# Patient Record
Sex: MALE | Race: WHITE | NOT HISPANIC OR LATINO | Employment: FULL TIME | URBAN - METROPOLITAN AREA
[De-identification: names, ages, dates, MRNs, and addresses within clinical notes are randomized per-mention and may not be internally consistent; named-entity substitution may affect disease eponyms.]

---

## 2019-03-13 ENCOUNTER — OFFICE VISIT (OUTPATIENT)
Dept: FAMILY MEDICINE | Facility: CLINIC | Age: 29
End: 2019-03-13
Payer: COMMERCIAL

## 2019-03-13 VITALS
DIASTOLIC BLOOD PRESSURE: 79 MMHG | SYSTOLIC BLOOD PRESSURE: 126 MMHG | TEMPERATURE: 97.5 F | HEART RATE: 61 BPM | OXYGEN SATURATION: 100 % | RESPIRATION RATE: 16 BRPM

## 2019-03-13 DIAGNOSIS — Z71.84 TRAVEL ADVICE ENCOUNTER: Primary | ICD-10-CM

## 2019-03-13 PROCEDURE — 90471 IMMUNIZATION ADMIN: CPT | Mod: GA | Performed by: NURSE PRACTITIONER

## 2019-03-13 PROCEDURE — 90472 IMMUNIZATION ADMIN EACH ADD: CPT | Mod: GA | Performed by: NURSE PRACTITIONER

## 2019-03-13 PROCEDURE — 90632 HEPA VACCINE ADULT IM: CPT | Mod: GA | Performed by: NURSE PRACTITIONER

## 2019-03-13 PROCEDURE — 99402 PREV MED CNSL INDIV APPRX 30: CPT | Mod: 25 | Performed by: NURSE PRACTITIONER

## 2019-03-13 PROCEDURE — 90715 TDAP VACCINE 7 YRS/> IM: CPT | Mod: GA | Performed by: NURSE PRACTITIONER

## 2019-03-13 PROCEDURE — 90691 TYPHOID VACCINE IM: CPT | Mod: GA | Performed by: NURSE PRACTITIONER

## 2019-03-13 PROCEDURE — 90717 YELLOW FEVER VACCINE SUBQ: CPT | Mod: GA | Performed by: NURSE PRACTITIONER

## 2019-03-13 RX ORDER — ACETAZOLAMIDE 125 MG/1
TABLET ORAL
Qty: 20 TABLET | Refills: 0 | Status: SHIPPED | OUTPATIENT
Start: 2019-03-13 | End: 2021-03-27

## 2019-03-13 RX ORDER — AZITHROMYCIN 500 MG/1
500 TABLET, FILM COATED ORAL DAILY
Qty: 3 TABLET | Refills: 0 | Status: SHIPPED | OUTPATIENT
Start: 2019-03-13 | End: 2019-03-16

## 2019-03-13 NOTE — PROGRESS NOTES
Nurse Note      Itinerary:  [place]  ( [place] x 4 days > [place] x4 days > [place] > 1 day in [facility] )          Departure Date: 04/24/2019      Return Date: 05/10/2019      Length of Trip 17 days       Reason for Travel: Tourism           Urban or rural: both      Accommodations: AirB        IMMUNIZATION HISTORY  Have you received any immunizations within the past 4 weeks?  No  Have you ever fainted from having your blood drawn or from an injection?  No  Have you ever had a fever reaction to vaccination?  No  Have you ever had any bad reaction or side effect from any vaccination?  No  Have you ever had hepatitis A or B vaccine?  Yes  Do you live (or work closely) with anyone who has AIDS, an AIDS-like condition, any other immune disorder or who is on chemotherapy for cancer?  No  Do you have a family history of immunodeficiency?  No  Have you received any injection of immune globulin or any blood products during the past 12 months?  No    Patient roomed by Jacki Sharma MA     Reji Galvez is a 28 year old male seen today with spouse for counsultation for international travel to [place] for Tourism.  Patient itinerary :  See above  Patient's activities will include sightseeing, hiking and high altitude exposure.    Patient's country of birth is [place]    Special medical concerns: none  Pre-travel questionnaire was completed by patient and reviewed by provider.     Vitals: /79   Pulse 61   Temp 97.5  F (36.4  C) (Oral)   Resp 16   SpO2 100%   BMI= There is no height or weight on file to calculate BMI.    EXAM:  General:  Well-nourished, well-developed in no acute distress.  Appears to be stated age, interacts appropriately and expresses understanding of information given to patient.    Current Outpatient Medications   Medication Sig Dispense Refill     acetaZOLAMIDE (DIAMOX) 125 MG tablet Take one tab every 12 hours starting 24 hours prior to [place]  and  continue for 2-3 days 20 tablet 0     azithromycin (ZITHROMAX) 500 MG tablet Take 1 tablet (500 mg) by mouth daily for 3 doses Take 1 tablet a day for up to 3 days for severe diarrhea 3 tablet 0     There is no problem list on file for this patient.    Allergies   Allergen Reactions     Penicillins          Immunizations discussed include:   Hepatitis A:  Ordered/given today, risks, benefits and side effects reviewed  Hepatitis B: Up to date  Influenza: Up to date  Typhoid: Ordered/given today, risks, benefits and side effects reviewed  Rabies: Declined  reviewed managment of a animal bite or scratch (washing wound, seek medical care within 24 hours for post exposure prophylaxis )  Yellow Fever: Stamaril Ordered/given today - consent completed, side effects, precautions, allergies, risks discussed. Patient expressed understanding.  Iraqi Encephalitis: Not indicated  Meningococcus: Not indicated  Tetanus/Diphtheria: Ordered/given today, risks, benefits and side effects reviewed  Measles/Mumps/Rubella: Up to date  Cholera: Not needed  Polio: Up to date  Pneumococcal: Under age of 65  Varicella: Immune by disease history per patient report  Zostavax:  Not indicated  Shingrix: Not indicated  HPV:  Not indicated  TB:  Low risk     Stamaril Informed Consent    The patient was provided with a copy of the IRB-approved consent form and all questions were answered before the patient agreed to participate by signing the informed consent document.   A copy of the form was provided to the patient.    Date: March 13, 2019   Consent Version Date: 5/10/2017   Consent Obtained by:  Caren Martini CNP (Lori)     HIPAA:  Yes  HIPAA Authorization Signed Date: March 13, 2019       Inclusion/Exclusion Criteria:    (Similar to Yellow Fever-VAX)      The patient met all of the following inclusion criteria in order to be eligible for the Stamaril vaccination under this EAP (Expanded Access Investigational New Drug Program)           At  increased risk for YF, including researchers, laboratory workers, vaccine production staff, and those who are traveling within 30 days to a YF-endemic region or to a country requiring proof of YF vaccination under IHRs (International Health Regulations)?       Yes     Patient is greater than or equal to 9 months of age on the day of vaccination?     Yes     Patient is greater than or equal to 18 years of age and signed and dated the Consent Forms?     Yes     Patient is < 18 years of age and parent(s)/guardian(s) signed and dated the Consent Forms?      Patient is 7 years to < 18 years of age and signed and dated the Assent form?        No Assent is required.  Patient is <7 years of age.     No      No      N/A     The patient did not meet any of the following criteria that would have excluded the patient from receiving the Stamaril vaccination under this EAP              Patient is less than 9 months of age.       No     The patient is breast-feeding and cannot stop nursing for at least 14 days after vaccination.    Note: Yellow Fever vaccine virus may be transmissible via breast milk by nursing mothers who are vaccinated during the final 2 weeks of pregnancy or post-partum.   Following transmission, infants may develop encephalitis.  The minimum time of discontinuation of breastfeeding for 14 days after vaccination is based on the expected clearance of live-attenuated vaccine virus.       No     The patient is immunosuppressed, whether congenital or idiopathic, including for example, leukemia, lymphoma, other malignancies, and patients who are receiving immunosuppressant medications (e.g. Systemic corticosteroids [greater than the standard dose of topical or inhaled steroids], alkylating drugs, antimetabolites, of other cytotoxic or immunomodulatory drugs) or radiation therapy or organ transplantation.       No     The patient has known hypersensitivity to the active substance or to any of the excipients of  Stamaril vaccine or to eggs or chicken proteins.     No     The patient is symptomatic for human immunodeficiency virus (HIV) infection     No     The patient is asymptomatic for HIV infection but accompanied by evidence of severe immune suppression    Note:  Evidence of severe immune suppression includes CD4+ T-cell counts < 200 cubic millimeters (or < 15% total lymphocytes in children aged < 6 years), or as determined by the health care provider.       No     The patient has a history of thymus dysfunction (including myasthenia gravis, thymoma, thymectomy)     No     Moderate or severe febrile illness or acute illness    Note: Participation in the EAP can be reassessed when moderate or severe febrile illness or acute illness has resolved.       No         Altitude Exposure on this trip: yes  Past tolerance to Altitude: highest sleeping altitude is about 5,500 ft    ASSESSMENT/PLAN:    ICD-10-CM    1. Travel advice encounter Z71.89 acetaZOLAMIDE (DIAMOX) 125 MG tablet     azithromycin (ZITHROMAX) 500 MG tablet     VACCINE ADMINISTRATION, INITIAL     VACCINE ADMINISTRATION, EACH ADDITIONAL     I have reviewed general recommendations for safe travel   including: food/water precautions, insect precautions, safer sex   practices given high prevalence of Zika, HIV and other STDs,   roadway safety. Educational materials and [employer] report provided.    Malaraia prophylaxis recommended: none  Symptomatic treatment for traveler's diarrhea: azithromycin  Altitude illness prevention and treatment: Diamox prescription given with instructions on use and education provided on Acute altitude illness recognition and prevention.        Evacuation insurance advised and resources were provided to patient.    Total visit time 30 minutes  with over 50% of time spent counseling patient as detailed above.    Caren Martini CNP

## 2019-03-13 NOTE — NURSING NOTE
Chief Complaint   Patient presents with     Travel Clinic     [place]      /79   Pulse 61   Temp 97.5  F (36.4  C) (Oral)   Resp 16   SpO2 100%  There is no height or weight on file to calculate BMI.  bp completed using cuff size: regular       Health Maintenance addressed:  NONE    n/a    Jacki Sharma MA

## 2019-03-13 NOTE — PATIENT INSTRUCTIONS
Today March 13, 2019 you received the    Hepatitis A Vaccine - Please return on 9/9/19 or later for your 2nd and final dose.    Yellow Fever (YF)    Tetanus (Tdap) Vaccine    Typhoid - injectable. This vaccine is valid for two years.   .    These appointments can be made as a NURSE ONLY visit.    **It is very important for the vaccinations to be given on the scheduled day(s), this helps ensure you receive the full effectiveness of the vaccine.**    Please call [facility] with any questions [phone]    Thank you for visiting [facility]

## 2019-03-13 NOTE — NURSING NOTE
Screening Questionnaire for Adult Immunization    Are you sick today?   No   Do you have allergies to medications, food, a vaccine component or latex?   No   Have you ever had a serious reaction after receiving a vaccination?   No   Do you have a long-term health problem with heart disease, lung disease, asthma, kidney disease, metabolic disease (e.g. diabetes), anemia, or other blood disorder?   No   Do you have cancer, leukemia, HIV/AIDS, or any other immune system problem?   No   In the past 3 months, have you taken medications that affect  your immune system, such as prednisone, other steroids, or anticancer drugs; drugs for the treatment of rheumatoid arthritis, Crohn s disease, or psoriasis; or have you had radiation treatments?   No   Have you had a seizure, or a brain or other nervous system problem?   No   During the past year, have you received a transfusion of blood or blood     products, or been given immune (gamma) globulin or antiviral drug?   No   For women: Are you pregnant or is there a chance you could become        pregnant during the next month?   No   Have you received any vaccinations in the past 4 weeks?   No     Immunization questionnaire answers were all negative.        Per orders of RAF Martini, injection of YF, Typhoid, Hep A, and TDAP given by Jacki Sharma. Patient instructed to remain in clinic for 15 minutes afterwards, and to report any adverse reaction to me immediately.       Screening performed by Jacki Sharma on 3/13/2019 at 9:24 AM.

## 2020-03-03 ENCOUNTER — OFFICE VISIT (OUTPATIENT)
Dept: FAMILY MEDICINE | Facility: CLINIC | Age: 30
End: 2020-03-03
Payer: COMMERCIAL

## 2020-03-03 VITALS
RESPIRATION RATE: 16 BRPM | TEMPERATURE: 98.7 F | OXYGEN SATURATION: 99 % | WEIGHT: 225 LBS | HEART RATE: 74 BPM | HEIGHT: 73 IN | DIASTOLIC BLOOD PRESSURE: 82 MMHG | BODY MASS INDEX: 29.82 KG/M2 | SYSTOLIC BLOOD PRESSURE: 126 MMHG

## 2020-03-03 DIAGNOSIS — Z13.6 SCREENING FOR CARDIOVASCULAR CONDITION: ICD-10-CM

## 2020-03-03 DIAGNOSIS — Z00.00 ROUTINE GENERAL MEDICAL EXAMINATION AT A HEALTH CARE FACILITY: Primary | ICD-10-CM

## 2020-03-03 DIAGNOSIS — Z83.719 FAMILY HISTORY OF COLONIC POLYPS: ICD-10-CM

## 2020-03-03 DIAGNOSIS — Z11.4 ENCOUNTER FOR SCREENING FOR HIV: ICD-10-CM

## 2020-03-03 DIAGNOSIS — Z12.5 SCREENING PSA (PROSTATE SPECIFIC ANTIGEN): ICD-10-CM

## 2020-03-03 DIAGNOSIS — Z13.1 SCREENING FOR DIABETES MELLITUS: ICD-10-CM

## 2020-03-03 PROCEDURE — 80061 LIPID PANEL: CPT | Performed by: FAMILY MEDICINE

## 2020-03-03 PROCEDURE — 99395 PREV VISIT EST AGE 18-39: CPT | Mod: 25 | Performed by: FAMILY MEDICINE

## 2020-03-03 PROCEDURE — 90471 IMMUNIZATION ADMIN: CPT | Performed by: FAMILY MEDICINE

## 2020-03-03 PROCEDURE — 82947 ASSAY GLUCOSE BLOOD QUANT: CPT | Performed by: FAMILY MEDICINE

## 2020-03-03 PROCEDURE — 36415 COLL VENOUS BLD VENIPUNCTURE: CPT | Performed by: FAMILY MEDICINE

## 2020-03-03 PROCEDURE — 90632 HEPA VACCINE ADULT IM: CPT | Performed by: FAMILY MEDICINE

## 2020-03-03 PROCEDURE — 87389 HIV-1 AG W/HIV-1&-2 AB AG IA: CPT | Performed by: FAMILY MEDICINE

## 2020-03-03 ASSESSMENT — ENCOUNTER SYMPTOMS
DIARRHEA: 0
HEMATOCHEZIA: 0
DYSURIA: 0
PARESTHESIAS: 0
CONSTIPATION: 0
ABDOMINAL PAIN: 0
NAUSEA: 0
MYALGIAS: 0
HEMATURIA: 0
WEAKNESS: 0
ARTHRALGIAS: 0
HEARTBURN: 0
JOINT SWELLING: 0
COUGH: 0
FREQUENCY: 0
SORE THROAT: 0
NERVOUS/ANXIOUS: 0
DIZZINESS: 0
HEADACHES: 0
FEVER: 0
PALPITATIONS: 0
SHORTNESS OF BREATH: 0
EYE PAIN: 0
CHILLS: 0

## 2020-03-03 ASSESSMENT — MIFFLIN-ST. JEOR: SCORE: 2044.47

## 2020-03-03 NOTE — PROGRESS NOTES
symSUBJECTIVE:   CC: Luis Galvez is an 29 year old male who presents for preventative health visit.     Healthy Habits:     Getting at least 3 servings of Calcium per day:  Yes    Bi-annual eye exam:  Yes    Dental care twice a year:  Yes    Sleep apnea or symptoms of sleep apnea:  None    Diet:  Regular (no restrictions)    Frequency of exercise:  4-5 days/week    Duration of exercise:  45-60 minutes    Taking medications regularly:  Yes    Medication side effects:  Not applicable    PHQ-2 Total Score: 0    Additional concerns today:  Yes (both brothers had colonoscopies in their 30's and had polyps removed. )    Today's PHQ-2 Score:   PHQ-2 ( 1999 Pfizer) 3/3/2020   Q1: Little interest or pleasure in doing things 0   Q2: Feeling down, depressed or hopeless 0   PHQ-2 Score 0   Q1: Little interest or pleasure in doing things Not at all   Q2: Feeling down, depressed or hopeless Not at all   PHQ-2 Score 0       Abuse: Current or Past(Physical, Sexual or Emotional)- No  Do you feel safe in your environment? Yes        Social History     Tobacco Use     Smoking status: Never Smoker     Smokeless tobacco: Never Used   Substance Use Topics     Alcohol use: Yes     Comment: 5 drinks a week          Alcohol Use 3/3/2020   Prescreen: >3 drinks/day or >7 drinks/week? No   Prescreen: >3 drinks/day or >7 drinks/week? -   No flowsheet data found.    Last PSA: No results found for: PSA    Reviewed orders with patient. Reviewed health maintenance and updated orders accordingly - Yes       Reviewed and updated as needed this visit by clinical staff  Tobacco  Allergies  Meds  Med Hx  Surg Hx  Fam Hx  Soc Hx      Reviewed and updated as needed this visit by Provider    Work - .  Exercise - 4 days per week. Lifting weights.   Lives with his wife.     Oldest brother - Jose - abdominal pain - 2 polyps - one was precancerous.   32 yo other brother - 1 polyp - benign.   Paternal father - lung cancer.     Patient  "had cholesterol and diabetes checked and it was fine - it was done in college.     Review of Systems   Constitutional: Negative for chills and fever.   HENT: Negative for congestion, ear pain, hearing loss and sore throat.    Eyes: Negative for pain and visual disturbance.   Respiratory: Negative for cough and shortness of breath.    Cardiovascular: Negative for chest pain, palpitations and peripheral edema.   Gastrointestinal: Negative for abdominal pain, constipation, diarrhea, heartburn, hematochezia and nausea.   Genitourinary: Negative for discharge, dysuria, frequency, genital sores, hematuria, impotence and urgency.   Musculoskeletal: Negative for arthralgias, joint swelling and myalgias.   Skin: Negative for rash.   Neurological: Negative for dizziness, weakness, headaches and paresthesias.   Psychiatric/Behavioral: Negative for mood changes. The patient is not nervous/anxious.      OBJECTIVE:   /82 (BP Location: Left arm, Patient Position: Sitting, Cuff Size: Adult Large)   Pulse 74   Temp 98.7  F (37.1  C) (Oral)   Resp 16   Ht 1.854 m (6' 1\")   Wt 102.1 kg (225 lb)   SpO2 99%   BMI 29.69 kg/m      Physical Exam  GENERAL: healthy, alert and no distress  EYES: Eyes grossly normal to inspection, PERRL and conjunctivae and sclerae normal  HENT: ear canals and TM's normal, nose and mouth without ulcers or lesions  NECK: no adenopathy, no asymmetry, masses, or scars and thyroid normal to palpation  RESP: lungs clear to auscultation - no rales, rhonchi or wheezes  CV: regular rate and rhythm, normal S1 S2, no S3 or S4, no murmur, click or rub, no peripheral edema and peripheral pulses strong  ABDOMEN: soft, nontender, no hepatosplenomegaly, no masses and bowel sounds normal   (male): normal male genitalia without lesions or urethral discharge, no hernia  MS: no gross musculoskeletal defects noted, no edema  SKIN: no suspicious lesions or rashes  NEURO: Normal strength and tone, mentation intact " "and speech normal  PSYCH: mentation appears normal, affect normal/bright         ASSESSMENT/PLAN:   1. Routine general medical examination at a health care facility       2. Family history of colonic polyps  No known family history of colon cancer but colonic polyps present.  As per his request colonoscopy referral is done.   - GASTROENTEROLOGY ADULT REF PROCEDURE ONLY    3. Screening for diabetes mellitus     - Glucose    4. Screening PSA (prostate specific antigen)       5. Screening for cardiovascular condition     - Lipid panel reflex to direct LDL Non-fasting    6. Encounter for screening for HIV     - HIV Screening    COUNSELING:   Reviewed preventive health counseling, as reflected in patient instructions  Special attention given to:        Regular exercise       Healthy diet/nutrition       Vision screening       Hearing screening       Colon cancer screening    Estimated body mass index is 29.69 kg/m  as calculated from the following:    Height as of this encounter: 1.854 m (6' 1\").    Weight as of this encounter: 102.1 kg (225 lb).     Weight management plan: Discussed healthy diet and exercise guidelines     reports that he has never smoked. He has never used smokeless tobacco.    Counseling Resources:  ATP IV Guidelines  Pooled Cohorts Equation Calculator  FRAX Risk Assessment  ICSI Preventive Guidelines  Dietary Guidelines for Americans, 2010  Vinny's MyPlate  ASA Prophylaxis  Lung CA Screening    Nasir Crespo MD  [facility]  "

## 2020-03-03 NOTE — NURSING NOTE
Prior to immunization administration, verified patients identity using patient s name and date of birth. Please see Immunization Activity for additional information.     Screening Questionnaire for Adult Immunization    Are you sick today?   No   Do you have allergies to medications, food, a vaccine component or latex?   No   Have you ever had a serious reaction after receiving a vaccination?   No   Do you have a long-term health problem with heart, lung, kidney, or metabolic disease (e.g., diabetes), asthma, a blood disorder, no spleen, complement component deficiency, a cochlear implant, or a spinal fluid leak?  Are you on long-term aspirin therapy?   No   Do you have cancer, leukemia, HIV/AIDS, or any other immune system problem?   No   Do you have a parent, brother, or sister with an immune system problem?   No   In the past 3 months, have you taken medications that affect  your immune system, such as prednisone, other steroids, or anticancer drugs; drugs for the treatment of rheumatoid arthritis, Crohn s disease, or psoriasis; or have you had radiation treatments?   No   Have you had a seizure, or a brain or other nervous system problem?   No   During the past year, have you received a transfusion of blood or blood    products, or been given immune (gamma) globulin or antiviral drug?   No   For women: Are you pregnant or is there a chance you could become       pregnant during the next month?   No   Have you received any vaccinations in the past 4 weeks?   No     Immunization questionnaire answers were all negative.        Per orders of Dr. Crespo, injection of HEp A given by Mike March. Patient instructed to remain in clinic for 15 minutes afterwards, and to report any adverse reaction to me immediately.       Screening performed by Mike March on 3/3/2020 at 5:24 PM.

## 2020-03-03 NOTE — LETTER
March 5, 2020      Luis FORBES Lenny  [address] [facility] [address]        Dear ,    We are writing to inform you of your test results.    Results within acceptable limits.  -HIV test is normal..    Resulted Orders   HIV Screening   Result Value Ref Range    HIV Antigen Antibody Combo Nonreactive NR^Nonreactive          Comment:      HIV-1 p24 Ag & HIV-1/HIV-2 Ab Not Detected       If you have any questions or concerns, please call the clinic at the number listed above.       Sincerely,        Nasir Crespo MD/nr

## 2020-03-04 LAB
CHOLEST SERPL-MCNC: 225 MG/DL
GLUCOSE SERPL-MCNC: 94 MG/DL (ref 70–99)
HDLC SERPL-MCNC: 36 MG/DL
LDLC SERPL CALC-MCNC: 126 MG/DL
NONHDLC SERPL-MCNC: 189 MG/DL
TRIGL SERPL-MCNC: 317 MG/DL

## 2020-03-05 LAB — HIV 1+2 AB+HIV1 P24 AG SERPL QL IA: NONREACTIVE

## 2020-06-04 ENCOUNTER — VIRTUAL VISIT (OUTPATIENT)
Dept: FAMILY MEDICINE | Facility: OTHER | Age: 30
End: 2020-06-04

## 2020-06-04 NOTE — PROGRESS NOTES
"Date: 2020 08:20:25  Clinician: Marlen Vines  Clinician NPI: [id number]  Patient: Luis Galvez  Patient : 1990  Patient Address: [address] [place] [address]  Patient Phone: [phone]  Visit Protocol: URI  Patient Summary:  Luis is a 29 year old ( : 1990 ) male who initiated a Visit for COVID-19 (Coronavirus) evaluation and screening. When asked the question \"Please sign me up to receive news, health information and promotions. \", Luis responded \"No\".    Luis does not have any symptoms. Luis denies having wheezing, nausea, teeth pain, ageusia, diarrhea, sore throat, enlarged lymph nodes, malaise, myalgias, anosmia, facial pain or pressure, fever, cough, nasal congestion, vomiting, rhinitis, ear pain, headache, and chills. He also denies having recent facial or sinus surgery in the past 60 days and taking antibiotic medication for the symptoms. He is not experiencing dyspnea.    Pertinent COVID-19 (Coronavirus) information  In the past 14 days, Luis has not worked in a congregate living setting.   He does not work or volunteer as healthcare worker or a  and does not work or volunteer in a healthcare facility.   Luis also has not lived in a congregate living setting in the past 14 days. He does not live with a healthcare worker.   Luis has not had a close contact with a laboratory-confirmed COVID-19 patient within 14 days of symptom onset.   Pertinent medical history  Luis does not need a return to work/school note.   Weight: 215 lbs   Luis does not smoke or use smokeless tobacco.   Additional information as reported by the patient (free text): I was a part of [employer] clean up efforts and demonstrations (large crowds). Saw that [facility] recommends people who did that to get tested now.   Weight: 215 lbs    MEDICATIONS: No current medications, ALLERGIES: NKDA  Clinician Response:  Dear Luis,      Unfortunately, you do not meet criteria to be tested " through [facility]. We are only testing those with symptoms at this time.  What are the symptoms of COVID-19?  The most common symptoms are cough, fever and trouble breathing. After 14 days, if there's still no cough or fever, you likely don't have this virus.  If you develop a cough or fever, follow these guidelines.   If you're normally healthy: Please start another [facility] visit to report your symptoms. Go to [email or web address].   If you have a serious health problem (like cancer, heart failure, an organ transplant or kidney disease): Call your specialty clinic. Let them know that you might have COVID-19.    Where can I get more information?  To learn more about COVID-19 and how to care for yourself at home, please visit the CDC website at https://www.cdc.gov/coronavirus/2019-ncov/about/steps-when-sick.html.  For more about your care at [facility], please visit [email or web address]  If you are interested in becoming part of a [facility] clinic trial related to COVID19 please go to [email or web address] for information, if you qualify.   .      Diagnosis: Contact with and (suspected) exposure to other viral communicable diseases  Diagnosis ICD: Z20.828

## 2021-01-03 ENCOUNTER — HEALTH MAINTENANCE LETTER (OUTPATIENT)
Age: 31
End: 2021-01-03

## 2021-03-27 ENCOUNTER — OFFICE VISIT (OUTPATIENT)
Dept: URGENT CARE | Facility: URGENT CARE | Age: 31
End: 2021-03-27
Payer: COMMERCIAL

## 2021-03-27 ENCOUNTER — APPOINTMENT (OUTPATIENT)
Dept: CT IMAGING | Facility: CLINIC | Age: 31
End: 2021-03-27
Attending: EMERGENCY MEDICINE
Payer: COMMERCIAL

## 2021-03-27 ENCOUNTER — HOSPITAL ENCOUNTER (INPATIENT)
Facility: CLINIC | Age: 31
LOS: 2 days | Discharge: HOME | End: 2021-03-29
Attending: EMERGENCY MEDICINE | Admitting: INTERNAL MEDICINE
Payer: COMMERCIAL

## 2021-03-27 VITALS
TEMPERATURE: 98.5 F | DIASTOLIC BLOOD PRESSURE: 75 MMHG | WEIGHT: 225 LBS | RESPIRATION RATE: 16 BRPM | HEART RATE: 97 BPM | OXYGEN SATURATION: 98 % | SYSTOLIC BLOOD PRESSURE: 121 MMHG | BODY MASS INDEX: 29.69 KG/M2

## 2021-03-27 DIAGNOSIS — I30.9 ACUTE PERICARDITIS, UNSPECIFIED TYPE: ICD-10-CM

## 2021-03-27 DIAGNOSIS — R07.89 CHEST DISCOMFORT: Primary | ICD-10-CM

## 2021-03-27 LAB
ALBUMIN SERPL-MCNC: 3.7 G/DL (ref 3.4–5)
ALP SERPL-CCNC: 83 U/L (ref 40–150)
ALT SERPL W P-5'-P-CCNC: 28 U/L (ref 0–70)
ANION GAP SERPL CALCULATED.3IONS-SCNC: 3 MMOL/L (ref 3–14)
AST SERPL W P-5'-P-CCNC: 31 U/L (ref 0–45)
BASOPHILS # BLD AUTO: 0 10E9/L (ref 0–0.2)
BASOPHILS NFR BLD AUTO: 0.3 %
BILIRUB SERPL-MCNC: 0.3 MG/DL (ref 0.2–1.3)
BUN SERPL-MCNC: 12 MG/DL (ref 7–30)
CALCIUM SERPL-MCNC: 9 MG/DL (ref 8.5–10.1)
CHLORIDE SERPL-SCNC: 105 MMOL/L (ref 94–109)
CO2 SERPL-SCNC: 30 MMOL/L (ref 20–32)
CREAT SERPL-MCNC: 1.22 MG/DL (ref 0.66–1.25)
CRP SERPL-MCNC: 34.6 MG/L (ref 0–8)
D DIMER PPP FEU-MCNC: 0.3 UG/ML FEU (ref 0–0.5)
DIFFERENTIAL METHOD BLD: NORMAL
EOSINOPHIL # BLD AUTO: 0.1 10E9/L (ref 0–0.7)
EOSINOPHIL NFR BLD AUTO: 1 %
ERYTHROCYTE [DISTWIDTH] IN BLOOD BY AUTOMATED COUNT: 11.9 % (ref 10–15)
ERYTHROCYTE [SEDIMENTATION RATE] IN BLOOD BY WESTERGREN METHOD: 14 MM/H (ref 0–15)
GFR SERPL CREATININE-BSD FRML MDRD: 79 ML/MIN/{1.73_M2}
GLUCOSE SERPL-MCNC: 99 MG/DL (ref 70–99)
HCT VFR BLD AUTO: 40.9 % (ref 40–53)
HGB BLD-MCNC: 13.7 G/DL (ref 13.3–17.7)
IMM GRANULOCYTES # BLD: 0 10E9/L (ref 0–0.4)
IMM GRANULOCYTES NFR BLD: 0.4 %
LABORATORY COMMENT REPORT: NORMAL
LYMPHOCYTES # BLD AUTO: 1.5 10E9/L (ref 0.8–5.3)
LYMPHOCYTES NFR BLD AUTO: 14.2 %
MCH RBC QN AUTO: 28.8 PG (ref 26.5–33)
MCHC RBC AUTO-ENTMCNC: 33.5 G/DL (ref 31.5–36.5)
MCV RBC AUTO: 86 FL (ref 78–100)
MONOCYTES # BLD AUTO: 1.3 10E9/L (ref 0–1.3)
MONOCYTES NFR BLD AUTO: 12.6 %
NEUTROPHILS # BLD AUTO: 7.3 10E9/L (ref 1.6–8.3)
NEUTROPHILS NFR BLD AUTO: 71.5 %
NRBC # BLD AUTO: 0 10*3/UL
NRBC BLD AUTO-RTO: 0 /100
PLATELET # BLD AUTO: 241 10E9/L (ref 150–450)
POTASSIUM SERPL-SCNC: 3.5 MMOL/L (ref 3.4–5.3)
PROT SERPL-MCNC: 7.3 G/DL (ref 6.8–8.8)
RBC # BLD AUTO: 4.76 10E12/L (ref 4.4–5.9)
SARS-COV-2 RNA RESP QL NAA+PROBE: NEGATIVE
SODIUM SERPL-SCNC: 138 MMOL/L (ref 133–144)
SPECIMEN SOURCE: NORMAL
TROPONIN I SERPL-MCNC: 2.97 UG/L (ref 0–0.04)
TROPONIN I SERPL-MCNC: 4.35 UG/L (ref 0–0.04)
TROPONIN I SERPL-MCNC: 4.7 UG/L (ref 0–0.04)
TROPONIN I SERPL-MCNC: 5.58 UG/L (ref 0–0.04)
WBC # BLD AUTO: 10.2 10E9/L (ref 4–11)

## 2021-03-27 PROCEDURE — U0005 INFEC AGEN DETEC AMPLI PROBE: HCPCS | Performed by: EMERGENCY MEDICINE

## 2021-03-27 PROCEDURE — 99207 PR INPT ADMISSION FROM CLINIC: CPT | Performed by: FAMILY MEDICINE

## 2021-03-27 PROCEDURE — 84484 ASSAY OF TROPONIN QUANT: CPT | Performed by: INTERNAL MEDICINE

## 2021-03-27 PROCEDURE — 93005 ELECTROCARDIOGRAM TRACING: CPT

## 2021-03-27 PROCEDURE — U0003 INFECTIOUS AGENT DETECTION BY NUCLEIC ACID (DNA OR RNA); SEVERE ACUTE RESPIRATORY SYNDROME CORONAVIRUS 2 (SARS-COV-2) (CORONAVIRUS DISEASE [COVID-19]), AMPLIFIED PROBE TECHNIQUE, MAKING USE OF HIGH THROUGHPUT TECHNOLOGIES AS DESCRIBED BY CMS-2020-01-R: HCPCS | Performed by: EMERGENCY MEDICINE

## 2021-03-27 PROCEDURE — 71275 CT ANGIOGRAPHY CHEST: CPT

## 2021-03-27 PROCEDURE — 36415 COLL VENOUS BLD VENIPUNCTURE: CPT | Performed by: INTERNAL MEDICINE

## 2021-03-27 PROCEDURE — C9803 HOPD COVID-19 SPEC COLLECT: HCPCS

## 2021-03-27 PROCEDURE — 80053 COMPREHEN METABOLIC PANEL: CPT | Performed by: EMERGENCY MEDICINE

## 2021-03-27 PROCEDURE — 96374 THER/PROPH/DIAG INJ IV PUSH: CPT

## 2021-03-27 PROCEDURE — 99221 1ST HOSP IP/OBS SF/LOW 40: CPT | Performed by: INTERNAL MEDICINE

## 2021-03-27 PROCEDURE — 93005 ELECTROCARDIOGRAM TRACING: CPT | Mod: 76

## 2021-03-27 PROCEDURE — 85025 COMPLETE CBC W/AUTO DIFF WBC: CPT | Performed by: EMERGENCY MEDICINE

## 2021-03-27 PROCEDURE — 250N000011 HC RX IP 250 OP 636: Performed by: EMERGENCY MEDICINE

## 2021-03-27 PROCEDURE — 250N000013 HC RX MED GY IP 250 OP 250 PS 637: Performed by: INTERNAL MEDICINE

## 2021-03-27 PROCEDURE — 250N000009 HC RX 250: Performed by: EMERGENCY MEDICINE

## 2021-03-27 PROCEDURE — 99223 1ST HOSP IP/OBS HIGH 75: CPT | Mod: AI | Performed by: INTERNAL MEDICINE

## 2021-03-27 PROCEDURE — 86140 C-REACTIVE PROTEIN: CPT | Performed by: EMERGENCY MEDICINE

## 2021-03-27 PROCEDURE — 84484 ASSAY OF TROPONIN QUANT: CPT | Performed by: EMERGENCY MEDICINE

## 2021-03-27 PROCEDURE — 36415 COLL VENOUS BLD VENIPUNCTURE: CPT | Performed by: FAMILY MEDICINE

## 2021-03-27 PROCEDURE — 99291 CRITICAL CARE FIRST HOUR: CPT | Mod: 25

## 2021-03-27 PROCEDURE — 85379 FIBRIN DEGRADATION QUANT: CPT | Performed by: FAMILY MEDICINE

## 2021-03-27 PROCEDURE — 85652 RBC SED RATE AUTOMATED: CPT | Performed by: EMERGENCY MEDICINE

## 2021-03-27 PROCEDURE — 93000 ELECTROCARDIOGRAM COMPLETE: CPT | Performed by: FAMILY MEDICINE

## 2021-03-27 PROCEDURE — 210N000002 HC R&B HEART CARE

## 2021-03-27 PROCEDURE — 250N000013 HC RX MED GY IP 250 OP 250 PS 637: Performed by: EMERGENCY MEDICINE

## 2021-03-27 PROCEDURE — 99285 EMERGENCY DEPT VISIT HI MDM: CPT | Mod: 25

## 2021-03-27 PROCEDURE — 84484 ASSAY OF TROPONIN QUANT: CPT | Performed by: FAMILY MEDICINE

## 2021-03-27 RX ORDER — AMOXICILLIN 250 MG
2 CAPSULE ORAL 2 TIMES DAILY PRN
Status: DISCONTINUED | OUTPATIENT
Start: 2021-03-27 | End: 2021-03-29 | Stop reason: HOSPADM

## 2021-03-27 RX ORDER — IOPAMIDOL 755 MG/ML
78 INJECTION, SOLUTION INTRAVASCULAR ONCE
Status: COMPLETED | OUTPATIENT
Start: 2021-03-27 | End: 2021-03-27

## 2021-03-27 RX ORDER — BISACODYL 10 MG
10 SUPPOSITORY, RECTAL RECTAL DAILY PRN
Status: DISCONTINUED | OUTPATIENT
Start: 2021-03-27 | End: 2021-03-29 | Stop reason: HOSPADM

## 2021-03-27 RX ORDER — NALOXONE HYDROCHLORIDE 0.4 MG/ML
0.4 INJECTION, SOLUTION INTRAMUSCULAR; INTRAVENOUS; SUBCUTANEOUS
Status: DISCONTINUED | OUTPATIENT
Start: 2021-03-27 | End: 2021-03-29 | Stop reason: HOSPADM

## 2021-03-27 RX ORDER — AMOXICILLIN 250 MG
1 CAPSULE ORAL 2 TIMES DAILY PRN
Status: DISCONTINUED | OUTPATIENT
Start: 2021-03-27 | End: 2021-03-29 | Stop reason: HOSPADM

## 2021-03-27 RX ORDER — POLYETHYLENE GLYCOL 3350 17 G/17G
17 POWDER, FOR SOLUTION ORAL DAILY PRN
Status: DISCONTINUED | OUTPATIENT
Start: 2021-03-27 | End: 2021-03-29 | Stop reason: HOSPADM

## 2021-03-27 RX ORDER — NALOXONE HYDROCHLORIDE 0.4 MG/ML
0.2 INJECTION, SOLUTION INTRAMUSCULAR; INTRAVENOUS; SUBCUTANEOUS
Status: DISCONTINUED | OUTPATIENT
Start: 2021-03-27 | End: 2021-03-29 | Stop reason: HOSPADM

## 2021-03-27 RX ORDER — OXYCODONE HYDROCHLORIDE 5 MG/1
5-10 TABLET ORAL
Status: DISCONTINUED | OUTPATIENT
Start: 2021-03-27 | End: 2021-03-29 | Stop reason: HOSPADM

## 2021-03-27 RX ORDER — CETIRIZINE HYDROCHLORIDE 10 MG/1
10 TABLET ORAL DAILY PRN
COMMUNITY
End: 2022-10-13

## 2021-03-27 RX ORDER — ONDANSETRON 2 MG/ML
4 INJECTION INTRAMUSCULAR; INTRAVENOUS EVERY 6 HOURS PRN
Status: DISCONTINUED | OUTPATIENT
Start: 2021-03-27 | End: 2021-03-29 | Stop reason: HOSPADM

## 2021-03-27 RX ORDER — ASPIRIN 81 MG/1
324 TABLET, CHEWABLE ORAL ONCE
Status: COMPLETED | OUTPATIENT
Start: 2021-03-27 | End: 2021-03-27

## 2021-03-27 RX ORDER — ONDANSETRON 4 MG/1
4 TABLET, ORALLY DISINTEGRATING ORAL EVERY 6 HOURS PRN
Status: DISCONTINUED | OUTPATIENT
Start: 2021-03-27 | End: 2021-03-29 | Stop reason: HOSPADM

## 2021-03-27 RX ORDER — DIPHENHYDRAMINE HCL 25 MG
25-50 CAPSULE ORAL EVERY 8 HOURS PRN
Status: DISCONTINUED | OUTPATIENT
Start: 2021-03-27 | End: 2021-03-29 | Stop reason: HOSPADM

## 2021-03-27 RX ORDER — DIPHENHYDRAMINE HCL 25 MG
25-50 TABLET ORAL EVERY 8 HOURS PRN
COMMUNITY
End: 2022-10-13

## 2021-03-27 RX ORDER — LIDOCAINE 40 MG/G
CREAM TOPICAL
Status: DISCONTINUED | OUTPATIENT
Start: 2021-03-27 | End: 2021-03-29 | Stop reason: HOSPADM

## 2021-03-27 RX ORDER — IBUPROFEN 600 MG/1
600 TABLET, FILM COATED ORAL 3 TIMES DAILY
Status: DISCONTINUED | OUTPATIENT
Start: 2021-03-27 | End: 2021-03-29

## 2021-03-27 RX ORDER — CETIRIZINE HYDROCHLORIDE 10 MG/1
10 TABLET ORAL DAILY PRN
Status: DISCONTINUED | OUTPATIENT
Start: 2021-03-27 | End: 2021-03-29 | Stop reason: HOSPADM

## 2021-03-27 RX ORDER — HYDROMORPHONE HYDROCHLORIDE 1 MG/ML
.3-.5 INJECTION, SOLUTION INTRAMUSCULAR; INTRAVENOUS; SUBCUTANEOUS
Status: DISCONTINUED | OUTPATIENT
Start: 2021-03-27 | End: 2021-03-29 | Stop reason: HOSPADM

## 2021-03-27 RX ORDER — IBUPROFEN 200 MG
400-600 TABLET ORAL EVERY 8 HOURS PRN
Status: ON HOLD | COMMUNITY
End: 2021-03-29

## 2021-03-27 RX ORDER — KETOROLAC TROMETHAMINE 15 MG/ML
15 INJECTION, SOLUTION INTRAMUSCULAR; INTRAVENOUS ONCE
Status: COMPLETED | OUTPATIENT
Start: 2021-03-27 | End: 2021-03-27

## 2021-03-27 RX ADMIN — IOPAMIDOL 78 ML: 755 INJECTION, SOLUTION INTRAVENOUS at 14:56

## 2021-03-27 RX ADMIN — KETOROLAC TROMETHAMINE 15 MG: 15 INJECTION, SOLUTION INTRAMUSCULAR; INTRAVENOUS at 16:21

## 2021-03-27 RX ADMIN — SODIUM CHLORIDE 60 ML: 9 INJECTION, SOLUTION INTRAVENOUS at 14:57

## 2021-03-27 RX ADMIN — IBUPROFEN 600 MG: 600 TABLET, FILM COATED ORAL at 22:15

## 2021-03-27 RX ADMIN — ASPIRIN 81 MG CHEWABLE TABLET 324 MG: 81 TABLET CHEWABLE at 14:41

## 2021-03-27 ASSESSMENT — ACTIVITIES OF DAILY LIVING (ADL)
DIFFICULTY_EATING/SWALLOWING: NO
TOILETING_ISSUES: NO
DIFFICULTY_COMMUNICATING: NO
FALL_HISTORY_WITHIN_LAST_SIX_MONTHS: NO
DRESSING/BATHING_DIFFICULTY: NO
ADLS_ACUITY_SCORE: 14

## 2021-03-27 ASSESSMENT — ENCOUNTER SYMPTOMS
CHILLS: 1
MYALGIAS: 1
SHORTNESS OF BREATH: 1

## 2021-03-27 NOTE — CONSULTS
[facility]    Cardiology Consultation     Date of Admission:  3/27/2021    Assessment & Plan   Luis Galvez is a 30 year old male who was admitted on 3/27/2021. I was asked to see the patient for chest pain and diffuse ST elevation    Patient had chest pain and shortness of breath with chills and muscle aches since yesterday.  He originally was seen in urgent care however then he was seen in our emergency department.  Covid testing is negative.  He did receive the Covid vaccine 3 days ago.    EKG is consistent with possible pericarditis with diffuse ST elevation and very minimal CA depression.  Troponin is elevated at 2.97.    The patient is completely chest pain-free if he sets at a certain angle.  This is approximately 30 degrees in the bed.  If he lays flat he develops chest pain.  If he takes a deep breath he developed chest pain.    I was called emergently to the emergency department for evaluation of the patient given the ST elevation chest pain and troponin elevation.  Overall this is consistent with myopericarditis and not a acute ST elevation acute coronary syndrome.    Chest CT scan is negative for pulmonary embolism and D-dimer is normal.    CRP is elevated consistent with myopericarditis    I did a focused bedside ultrasound and there is no significant pericardial effusion.  There is no major abnormalities of his mitral or aortic valve.  There is no regional wall motion abnormalities and LVEF appears normal    The patient specifically denies cocaine or stimulant use.  He seems to be a reliable historian    #1 myopericarditis  The patient is currently not technically admitted to the hospital therefore I cannot put in orders.  I would recommend initiation of colchicine and aspirin once the patient is on the floor.    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      Souleymane Jalloh MD     Code Status    No Order    Reason for Consult   Reason for consult: Chest pain    Primary Care Physician   [facility]    Chief Complaint   Chest pain    History is obtained from the patient    History of Present Illness   Luis Galvez is a 30 year old male who was admitted on 3/27/2021. I was asked to see the patient for chest pain and diffuse ST elevation    Patient had chest pain and shortness of breath with chills and muscle aches since yesterday.  He originally was seen in urgent care however then he was seen in our emergency department.  Covid testing is negative.  He did receive the Covid vaccine 3 days ago.    EKG is consistent with possible pericarditis with diffuse ST elevation and very minimal MD depression.  Troponin is elevated at 2.97.    The patient is completely chest pain-free if he sets at a certain angle.  This is approximately 30 degrees in the bed.  If he lays flat he develops chest pain.  If he takes a deep breath he developed chest pain.    I was called emergently to the emergency department for evaluation of the patient given the ST elevation chest pain and troponin elevation.  Overall this is consistent with myopericarditis and not a acute ST elevation acute coronary syndrome.    Chest CT scan is negative for pulmonary embolism and D-dimer is normal.    CRP is elevated consistent with myopericarditis    I did a focused bedside ultrasound and there is no significant pericardial effusion.  There is no major abnormalities of his mitral or aortic valve.  There is no regional wall motion abnormalities and LVEF appears normal    The patient specifically denies cocaine or stimulant use.  He seems to be a reliable historian    Past Medical History   I have reviewed this patient's medical history and updated it with pertinent information if needed.   No past medical history on file.    Past Surgical  History   I have reviewed this patient's surgical history and updated it with pertinent information if needed.  Past Surgical History:   Procedure Laterality Date     CRYO-FOCAL RETINAL LASER OS (LEFT EYE) Left 2006     right knee arthroscopic surgery Right 2012       Prior to Admission Medications   Prior to Admission Medications   Prescriptions Last Dose Informant Patient Reported? Taking?   cetirizine (ZYRTEC) 10 MG tablet 3/27/2021 at Unknown time Spouse/Significant Other Yes Yes   Sig: Take 10 mg by mouth daily as needed for allergies   diphenhydrAMINE (BENADRYL) 25 MG tablet 3/27/2021 at Unknown time Spouse/Significant Other Yes Yes   Sig: Take 25-50 mg by mouth every 8 hours as needed for itching or allergies   ibuprofen (ADVIL/MOTRIN) 200 MG tablet 3/27/2021 at 1350 Spouse/Significant Other Yes Yes   Sig: Take 400-600 mg by mouth every 8 hours as needed for mild pain      Facility-Administered Medications: None     Allergies   Allergies   Allergen Reactions     Penicillins        Social History   I have reviewed this patient's social history and updated it with pertinent information if needed. Luis Galvez  reports that he has never smoked. He has never used smokeless tobacco. He reports current alcohol use. He reports that he does not use drugs.    Family History   I have reviewed this patient's family history and updated it with pertinent information if needed.   Family History   Problem Relation Age of Onset     Hypertension Father        Review of Systems   The 12 point Review of Systems is negative other than noted in the HPI or here.     Physical Exam       BP: 116/81 Pulse: 101   Resp: 13 SpO2: 96 % O2 Device: None (Room air)    Vital Signs with Ranges  Temp:  [98.5  F (36.9  C)] 98.5  F (36.9  C)  Pulse:  [] 101  Resp:  [6-17] 13  BP: (116-147)/(75-90) 116/81  SpO2:  [96 %-98 %] 96 %  0 lbs 0 oz    GENERAL APPEARANCE: Alert and oriented x3. No acute distress.  SKIN: Inspection of the skin  reveals no rashes, ulcerations, warm, dry  NECK: Supple and symmetric.   Normal JVP  LUNGS: Clear breath sounds throughout bilaterally, no wheezes, no rhonchi  CARDIOVASCULAR: S1, S2, regular rate and rhythm without any murmurs, gallops, rubs. The carotid pulses were normal and 2+ bilaterally without bruits. Peripheral pulses were 2+ and symmetric.  ABDOMEN: Soft, non-tender, non-distended with normal bowel sounds.  No ascites noted.  EXTREMITIES: No edema.  NEUROLOGIC:  Normal mood and affect.  Sensation to touch was normal.      Data   Results for orders placed or performed during the hospital encounter of 03/27/21 (from the past 24 hour(s))   CBC with platelets differential   Result Value Ref Range    WBC 10.2 4.0 - 11.0 10e9/L    RBC Count 4.76 4.4 - 5.9 10e12/L    Hemoglobin 13.7 13.3 - 17.7 g/dL    Hematocrit 40.9 40.0 - 53.0 %    MCV 86 78 - 100 fl    MCH 28.8 26.5 - 33.0 pg    MCHC 33.5 31.5 - 36.5 g/dL    RDW 11.9 10.0 - 15.0 %    Platelet Count 241 150 - 450 10e9/L    Diff Method Automated Method     % Neutrophils 71.5 %    % Lymphocytes 14.2 %    % Monocytes 12.6 %    % Eosinophils 1.0 %    % Basophils 0.3 %    % Immature Granulocytes 0.4 %    Nucleated RBCs 0 0 /100    Absolute Neutrophil 7.3 1.6 - 8.3 10e9/L    Absolute Lymphocytes 1.5 0.8 - 5.3 10e9/L    Absolute Monocytes 1.3 0.0 - 1.3 10e9/L    Absolute Eosinophils 0.1 0.0 - 0.7 10e9/L    Absolute Basophils 0.0 0.0 - 0.2 10e9/L    Abs Immature Granulocytes 0.0 0 - 0.4 10e9/L    Absolute Nucleated RBC 0.0    Comprehensive metabolic panel   Result Value Ref Range    Sodium 138 133 - 144 mmol/L    Potassium 3.5 3.4 - 5.3 mmol/L    Chloride 105 94 - 109 mmol/L    Carbon Dioxide 30 20 - 32 mmol/L    Anion Gap 3 3 - 14 mmol/L    Glucose 99 70 - 99 mg/dL    Urea Nitrogen 12 7 - 30 mg/dL    Creatinine 1.22 0.66 - 1.25 mg/dL    GFR Estimate 79 >60 mL/min/[1.73_m2]    GFR Estimate If Black >90 >60 mL/min/[1.73_m2]    Calcium 9.0 8.5 - 10.1 mg/dL    Bilirubin  Total 0.3 0.2 - 1.3 mg/dL    Albumin 3.7 3.4 - 5.0 g/dL    Protein Total 7.3 6.8 - 8.8 g/dL    Alkaline Phosphatase 83 40 - 150 U/L    ALT 28 0 - 70 U/L    AST 31 0 - 45 U/L   Troponin I   Result Value Ref Range    Troponin I ES 4.700 (HH) 0.000 - 0.045 ug/L   CRP inflammation   Result Value Ref Range    CRP Inflammation 34.6 (H) 0.0 - 8.0 mg/L   Erythrocyte sedimentation rate auto   Result Value Ref Range    Sed Rate 14 0 - 15 mm/h   CT Chest Pulmonary Embolism w Contrast    Narrative    CT CHEST PULMONARY EMBOLISM WITH CONTRAST March 27, 2021 3:14 PM    CLINICAL HISTORY: Chest pain. Elevated troponin.    TECHNIQUE: CT angiogram chest during arterial phase injection IV  contrast. 2D and 3D MIP reconstructions were performed by the CT  technologist. Dose reduction techniques were used.   CONTRAST: 78 mL Isovue-370        COMPARISON: None.    FINDINGS:  ANGIOGRAM CHEST: Pulmonary arteries are normal caliber and negative  for pulmonary emboli. Thoracic aorta is negative for dissection.     LUNGS AND PLEURA: No pleural effusions. The lungs are clear.    MEDIASTINUM/AXILLAE: No enlarged lymph nodes are identified in the  chest. No pericardial effusion.    UPPER ABDOMEN: Limited views of the upper abdomen are unremarkable.    MUSCULOSKELETAL: Unremarkable.      Impression    IMPRESSION: No acute abnormality in the chest. No evidence for  pulmonary embolism.

## 2021-03-27 NOTE — PHARMACY-ADMISSION MEDICATION HISTORY
Pharmacy Medication History  Admission medication history interview status for the 3/27/2021  admission is complete. See EPIC admission navigator for prior to admission medications     Location of Interview: Patient room  Medication history sources: Patient and Patient's family/friend (wife)    Significant changes made to the medication list:  Added all meds to list    In the past week, patient estimated taking medication this percent of the time: n/a. All prns    Additional medication history information:   none    Medication reconciliation completed by provider prior to medication history? No    Time spent in this activity: 10 minutes    Prior to Admission medications    Medication Sig Last Dose Taking? Auth Provider   cetirizine (ZYRTEC) 10 MG tablet Take 10 mg by mouth daily as needed for allergies 3/27/2021 at Unknown time Yes Unknown, Entered By History   diphenhydrAMINE (BENADRYL) 25 MG tablet Take 25-50 mg by mouth every 8 hours as needed for itching or allergies 3/27/2021 at Unknown time Yes Unknown, Entered By History   ibuprofen (ADVIL/MOTRIN) 200 MG tablet Take 400-600 mg by mouth every 8 hours as needed for mild pain 3/27/2021 at 1350 Yes Unknown, Entered By History       The information provided in this note is only as accurate as the sources available at the time of update(s)

## 2021-03-27 NOTE — ED NOTES
[facility]  ED Nurse Handoff Report    ED Chief complaint: Chest Pain      ED Diagnosis:   Final diagnoses:   Acute pericarditis, unspecified type       Code Status: Full Code    Allergies:   Allergies   Allergen Reactions     Penicillins        Patient Story: Pt went to urgent care with chest pain. Trop levels were elevated and pt was sent here  Focused Assessment:  Pt has chest pain that does radiate to back or arms. Pt had blood levels drawn and troponin is increasing. CT revealed no signs of PE. Echo of heart performed and ruled myopericarditis. Pt stable otherwise.     Treatments and/or interventions provided: EKG, CT, Echocardiogram   Patient's response to treatments and/or interventions: tolerated well    To be done/followed up on inpatient unit:  continue to monitor    Does this patient have any cognitive concerns?: none    Activity level - Baseline/Home:  Independent  Activity Level - Current:   Independent    Patient's Preferred language: English   Needed?: No    Isolation: None  Infection: Not Applicable  Patient tested for COVID 19 prior to admission: YES  Bariatric?: No    Vital Signs:   Vitals:    03/27/21 1419   BP: (!) 147/90   Pulse: 121   Resp: 17   SpO2: 97%       Cardiac Rhythm:     Was the PSS-3 completed:   Yes  What interventions are required if any?               Family Comments:   OBS brochure/video discussed/provided to patient/family: No              Name of person given brochure if not patient:               Relationship to patient:     For the majority of the shift this patient's behavior was Green.   Behavioral interventions performed were .    ED NURSE PHONE NUMBER: [id number]

## 2021-03-27 NOTE — PLAN OF CARE
A+Ox4, up indep. VSS on RA. Mid sternal CP 2/10 improving. No radiation, no SOB. NSR. Cardiology consult and echo pending.

## 2021-03-27 NOTE — PROGRESS NOTES
RECEIVING UNIT ED HANDOFF REVIEW    ED Nurse Handoff Report was reviewed by: Norma Edge RN on March 27, 2021 at 4:12 PM

## 2021-03-27 NOTE — PROGRESS NOTES
SUBJECTIVE:   Luis Galvez is a 30 year old male presenting with a chief complaint of shortness of breath ( a dull ache at the lower breast bone area lasting seconds and more noticeable with deep inhalation) chills, muscle aches at the posterior bilateral neck and posterior shoulders.  .The chest discomfort is improved when standing or sitting up.    Onset of symptoms was yesterday.  Treatment measures tried include Tylenol, Benadryl without relief.  .  Predisposing factors include none. .  Patient does bench presses; however, he had a problem with bench presses two weeks ago but not more recently.      Three days ago, patient received his second dose of the COVID-19 vaccine and developed chills, muscle aches.     Patient's Rapid nasopharyngeal COVID-19 test was performed today at  [facility].  The test result was negative for COVID-19.      No sick contacts with COVID-19.  No sick contacts with other illness.    No loss of smell/taste  No headaches  There has been some neck stiffness  No rash  No cough  No nose symptoms  No sore throat  No bluish lips/toes/fingers.      Past Medical History:    No major medical problems.     Current Outpatient Medications   Medication Sig Dispense Refill     acetaZOLAMIDE (DIAMOX) 125 MG tablet Take one tab every 12 hours starting 24 hours prior to [place]  and continue for 2-3 days (Patient not taking: Reported on 3/3/2020) 20 tablet 0     Social History     Tobacco Use     Smoking status: Never Smoker     Smokeless tobacco: Never Used   Substance Use Topics     Alcohol use: Yes     Comment: 5 drinks a week        ROS:  CONSTITUTIONAL:NEGATIVE  for fevers.   ENT/MOUTH: No sore throat/nasal symptoms.    RESP: positive for shortness of breath.    CV: positive for midline inferior chest discomfort more noticeable with deep inhalation and improved when sitting up/standing.  .    MUSCULOSKELETAL: positive for muscle aches only at the posterior neck and posterior shoulders/upper back.     NEURO: negative for headaches.      OBJECTIVE:  /75   Pulse 97   Temp 98.5  F (36.9  C)   Resp 16   Wt 102.1 kg (225 lb)   SpO2 98%   BMI 29.69 kg/m    GENERAL APPEARANCE: healthy, alert and no distress.  No acute respiratory distress.    RESP: lungs clear to auscultation - no rales, rhonchi or wheezes  CV: regular rates and rhythm, normal S1 S2, no murmur noted.  No friction rubs.    CHEST WALL:  I was unable to reproduce the chest discomfort with palpation over the anterior chest wall.  SKIN:  No diaphoresis.        EKG:  Normal sinus rate and rhythm (rate:  88 beats per minute) Normal axis.  There is an elevated ST segment at lead III, V4, V5.  Possible repolarization variant.      chest x-ray:  There was no X-ray tech at the clinic, and another X-ray tech will not be able to work at the urgent care clinic until after 3 pm today.      ASSESSMENT:  Chest Discomfort    PLAN:  Pending labs:  Troponin, D-Dimer    Addendum:  The Troponin I is elevated at 2.972 (normal values are 0 to 0.045).  The D-dimer is normal at 0.3.  I phoned the patient with the result of the elevated Troponin I and the normal D-Dimer    I cancelled the chest x-ray order, since there would be no X-ray tech until at the urgent care clinic after 3 pm and since the patient will be going to the [facility] emergency room for further evaluation and treatment.  Patient's vital signs were within normal limits. Patient will go to the emergency room via private vehicle (His wife will drive him there.)    Jonh Plascencia MD

## 2021-03-27 NOTE — H&P
"[facility]    History and Physical - Hospitalist Service       Date of Admission:  3/27/2021    Assessment & Plan   Lius Galvez is a 30 year old male admitted on 3/27/2021. He has no significant past medical history.  He presented to urgent care earlier today complaining of chest pain.  EKG was abnormal and initial troponin was elevated.  He was sent to the emergency department for evaluation and urgently evaluated by cardiology where history, exam and labs are felt to be consistent with pericarditis.  He is admitted for further evaluation and treatment.    Principal Problem:    Acute pericarditis, unspecified type    Admit as inpatient    Follow serial troponins and EKGs    Cardiology consult requested, I appreciate Dr. Jalloh's evaluation and recommendations    Check echo.  Will need repeat echo in 2 to 4 weeks    Treat with NSAIDs, ibuprofen 600 mg 3 times daily         Diet:   Regular  DVT Prophylaxis: Pneumatic Compression Devices  [place] Catheter: not present  Code Status:   Full         Disposition Plan   Expected discharge: Tomorrow, recommended to prior living arrangement once chest pain has been treated.  Entered: Jaymie Mcdonough MD 03/27/2021, 3:54 PM     The patient's care was discussed with the Patient and ED MD, Dr. Josue.    Jaymie Mcdonough MD  [facility]  Contact information available via [facility] Paging/Directory      ______________________________________________________________________    Chief Complaint   \"It is (chest pain) definitely better when I am sitting up.\"        History of Present Illness   Luis Galvez is a 30 year old male with history of seasonal allergies who presents emergency department complaining of chest pain.    Gama began having chest pain last evening.  He says pain was initially mild and he did not think much of it.  Pain worsened throughout the night hours and he was ultimately unable to sleep due to pain, got up out " of bed and went out to the couch.    Pain is a sharp quality does not radiate to the neck back arm or jaw.  He did not perspire.  He took some ibuprofen and this did not help the pain very much.  He went to urgent care for evaluation where EKG and some diffuse ST segment changes and initial troponin was elevated.  He was sent to the emergency department for evaluation.    In the emergency department, he was evaluated by Dr. Jalloh of cardiology.  Dr. Jalloh performed bedside echocardiogram, this does not show any wall motion abnormalities.  There is no pericardial effusion, and left ventricular ejection fraction appeared normal.  The history, clinical exam and EKG findings are felt to be consistent with myopericarditis.  Patient is admitted for further evaluation and treatment.    Review of Systems    The 10 point Review of Systems is negative other than noted in the HPI or here.     Past Medical History    I have reviewed this patient's medical history and updated it with pertinent information if needed.   No significant past medical history.    Past Surgical History   I have reviewed this patient's surgical history and updated it with pertinent information if needed.  Past Surgical History:   Procedure Laterality Date     CRYO-FOCAL RETINAL LASER OS (LEFT EYE) Left 2006     right knee arthroscopic surgery Right 2012       Social History   I have reviewed this patient's social history and updated it with pertinent information if needed.  Social History     Tobacco Use     Smoking status: Never Smoker     Smokeless tobacco: Never Used   Substance Use Topics     Alcohol use: Yes     Comment: 5 drinks a week      Drug use: Never       Family History   I have reviewed this patient's family history and updated it with pertinent information if needed.  Family History   Problem Relation Age of Onset     Hypertension Father        Prior to Admission Medications   Prior to Admission Medications   Prescriptions Last Dose  Informant Patient Reported? Taking?   cetirizine (ZYRTEC) 10 MG tablet 3/27/2021 at Unknown time Spouse/Significant Other Yes Yes   Sig: Take 10 mg by mouth daily as needed for allergies   diphenhydrAMINE (BENADRYL) 25 MG tablet 3/27/2021 at Unknown time Spouse/Significant Other Yes Yes   Sig: Take 25-50 mg by mouth every 8 hours as needed for itching or allergies   ibuprofen (ADVIL/MOTRIN) 200 MG tablet 3/27/2021 at 1350 Spouse/Significant Other Yes Yes   Sig: Take 400-600 mg by mouth every 8 hours as needed for mild pain      Facility-Administered Medications: None     Allergies   Allergies   Allergen Reactions     Penicillins        Physical Exam   Vital Signs:     BP: 116/81 Pulse: 101   Resp: 13 SpO2: 96 % O2 Device: None (Room air)    Weight: 0 lbs 0 oz    Constitutional: Awake, alert, cooperative, no apparent distress.  Eyes: Conjunctiva and pupils examined and normal.  HEENT: Moist mucous membranes, normal dentition.  Respiratory: Clear to auscultation bilaterally, no crackles or wheezing.  Cardiovascular: Regular rate and rhythm, normal S1 and S2, and no murmur noted. No friction rub.  GI: Soft, non-distended, non-tender, normal bowel sounds.  Lymph/Hematologic: No anterior cervical or supraclavicular adenopathy.  Skin: No rash on exposed skin.  Musculoskeletal: No joint swelling, erythema or tenderness.  Neurologic:Face is symmetric.  Moves both arms and both legs.  The neurologic exam is nonfocal.  Psychiatric: Alert, oriented to person, place and time, no obvious anxiety or depression.  Mood is very pleasant.        Data   Data reviewed today: I reviewed all medications, new labs and imaging results over the last 24 hours. I personally reviewed the chest CT image(s) showing no pulmonary embolus, no infiltrate. and EKG showing ST segment elevation in leads I, II, aVF and throughout the precordial leads.    Recent Labs   Lab 03/27/21  1433 03/27/21  1150   WBC 10.2  --    HGB 13.7  --    MCV 86  --    PLT  241  --      --    POTASSIUM 3.5  --    CHLORIDE 105  --    CO2 30  --    BUN 12  --    CR 1.22  --    ANIONGAP 3  --    FARTUN 9.0  --    GLC 99  --    ALBUMIN 3.7  --    PROTTOTAL 7.3  --    BILITOTAL 0.3  --    ALKPHOS 83  --    ALT 28  --    AST 31  --    TROPI 4.700* 2.972*     Recent Results (from the past 24 hour(s))   CT Chest Pulmonary Embolism w Contrast    Narrative    CT CHEST PULMONARY EMBOLISM WITH CONTRAST March 27, 2021 3:14 PM    CLINICAL HISTORY: Chest pain. Elevated troponin.    TECHNIQUE: CT angiogram chest during arterial phase injection IV  contrast. 2D and 3D MIP reconstructions were performed by the CT  technologist. Dose reduction techniques were used.   CONTRAST: 78 mL Isovue-370        COMPARISON: None.    FINDINGS:  ANGIOGRAM CHEST: Pulmonary arteries are normal caliber and negative  for pulmonary emboli. Thoracic aorta is negative for dissection.     LUNGS AND PLEURA: No pleural effusions. The lungs are clear.    MEDIASTINUM/AXILLAE: No enlarged lymph nodes are identified in the  chest. No pericardial effusion.    UPPER ABDOMEN: Limited views of the upper abdomen are unremarkable.    MUSCULOSKELETAL: Unremarkable.      Impression    IMPRESSION: No acute abnormality in the chest. No evidence for  pulmonary embolism.

## 2021-03-27 NOTE — ED PROVIDER NOTES
History     Chief Complaint:  Chest Pain    HPI  Luis Galvez is a 30 year old male who presents for evaluation of chest pain. He initially presented to urgent care today with this pain, shortness of breath, chills, muscle aches, and pain in both shoulders which all began yesterday. Of note, he received his second dose of the Moderna vaccine 3 days ago. Rapid COVID test done there was negative. His troponin there returned elevated at 2.97 and EKG showed ST elevation. On evaluation here he has minimal pain while resting but getting up to walk into the room worsened his pain and shortness of breath. He denies a history of blood clots, travel, or family history of clotting disorder.     Review of Systems   Constitutional: Positive for chills.   Respiratory: Positive for shortness of breath.    Cardiovascular: Positive for chest pain.   Musculoskeletal: Positive for myalgias.      10 systems reviewed and negative except as above and in HPI.      Allergies:  Penicillins    Medications:    Diamox     Past Medical History:    No history of cardiac issues or clots    Past Surgical History:    Cryo-Focal Retinal Laser OS (left eye)  Right knee arthroscopic surgery    Family History:    Hypertension in his father    Social History:  The patient arrives with a family member  Is a weightlifter       Physical Exam     Patient Vitals for the past 24 hrs:   BP Pulse Resp SpO2   03/27/21 1630 -- 90 24 98 %   03/27/21 1615 120/80 103 (!) 7 97 %   03/27/21 1600 124/80 100 10 97 %   03/27/21 1545 116/81 101 -- 96 %   03/27/21 1530 131/89 110 13 98 %   03/27/21 1515 131/85 105 (!) 6 97 %   03/27/21 1419 (!) 147/90 121 17 97 %     Physical Exam  General: Resting on the gurney, appears comfortable  Head:  The scalp, face, and head appear normal  Mouth/Throat: Mucus membranes are moist  CV:  Rapid but regular rate    Normal S1 and S2  No pathological murmur   Resp:  Breath sounds clear and equal bilaterally    Non-labored, no retractions  or accessory muscle use    No coarseness    No wheezing   GI:  Abdomen is soft, no rigidity    No tenderness to palpation  MS:  Normal motor assessment of all extremities.    Good capillary refill noted.    No lower extremity swelling, redness, or excess warmth  Skin:   No rash or lesions noted.  Neuro:  Speech is normal and fluent. No apparent deficit.  Psych:  Awake. Alert.  Normal affect.      Appropriate interactions.    Emergency Department Course     ECG  ECG taken at 1420, ECG read at 1423  Sinus tachycardia  Right chavez axis  Inferior infarct age undetermined   Abnormal EKG   Rate 114 bpm. UT interval 144 ms. QRS duration 98 ms. QT/QTc 338/465 ms. P-R-T axes 55 91 39.     ECG  ECG taken at 1443, ECG read at 1445  Sinus tachycardia  Acute pericarditis  Abnormal EKG   Rate 113 bpm. UT interval 148 ms. QRS duration 94 ms. QT/QTc 336/460 ms. P-R-T axes 55 89 42.     Imaging:  CT Chest Pulmonary Embolism w contrast   IMPRESSION: No acute abnormality in the chest. No evidence for   pulmonary embolism  Reading per radiology    Laboratory:  CBC: WBC 10.2, HGB 13.7,   CMP: WNL (Creatinine: 1.22)    Troponin (Collected 1433): 4.70 (H)  CRP inflammation: 34.6 (H)  Erythrocyte sedimentation rate auto: 14  Asymptomatic COVID-19 PCR: Pending     Emergency Department Course:    Reviewed:  I reviewed nursing notes, vitals, past medical history and care everywhere    Assessments:  1425 I obtained history and examined the patient as noted above.     1445 Cardiology performed a bedside cardiac ultrasound and spoke with patient    1540 I rechecked the patient and explained findings.     Consults:   1432: I spoke with cardiology regarding this patient  1450: I spoke with cardiology regarding the patient while they were at bedside  1537: I spoke with Dr. Mcdonough of hospitalist service regarding this patient.    Interventions:  1441 Aspirin 324 mg PO  1621 Toradol, 15 mg, IV    Disposition:  The patient was admitted to the  [facility] under the care of Dr. Mcdonough.     Impression & Plan      Critical Care time was 35 minutes for this patient excluding procedures.     Medical Decision Making:  Luis Galvez is a 30 year old male who presents to the emergency department today for evaluation of chest pain, abnormal EKG, and elevated troponin.  Initial concern was for ST elevation MI versus massive pulmonary embolism versus pericarditis.  EKG was repeated which is most consistent with pericarditis, however, given his pleuritic pain and considerably elevated troponin a CT PE study was obtained which was unremarkable.  I discussed the case with cardiology who came to the ED to see the patient and performed a bedside echo which showed no focal wall motion abnormalities.  They requested the patient be admitted to the hospitalist service, a formal echo be obtained.  The patient is comfortable with this plan and is admitted to the hospitalist in stable condition.      Covid-19  Luis Galvez was evaluated during a global COVID-19 pandemic, which necessitated consideration that the patient might be at risk for infection with the SARS-CoV-2 virus that causes COVID-19.   Applicable protocols for evaluation were followed during the patient's care.   COVID-19 was considered as part of the patient's evaluation. The plan for testing is:  a test was obtained during this visit.      Diagnosis:    ICD-10-CM    1. Acute pericarditis, unspecified type  I30.9 Asymptomatic SARS-CoV-2 COVID-19 Virus (Coronavirus) by PCR       Scribe Disclosure:  Lila AMARO, am serving as a scribe at 2:25 PM on 3/27/2021 to document services personally performed by Conchita Josue MD based on my observations and the provider's statements to me.    [facility] EMERGENCY DEPT     Conchita Josue MD  03/27/21 8921

## 2021-03-28 ENCOUNTER — APPOINTMENT (OUTPATIENT)
Dept: CARDIOLOGY | Facility: CLINIC | Age: 31
End: 2021-03-28
Attending: INTERNAL MEDICINE
Payer: COMMERCIAL

## 2021-03-28 LAB
ANION GAP SERPL CALCULATED.3IONS-SCNC: 1 MMOL/L (ref 3–14)
BUN SERPL-MCNC: 13 MG/DL (ref 7–30)
CALCIUM SERPL-MCNC: 8.6 MG/DL (ref 8.5–10.1)
CHLORIDE SERPL-SCNC: 107 MMOL/L (ref 94–109)
CO2 SERPL-SCNC: 29 MMOL/L (ref 20–32)
CREAT SERPL-MCNC: 1.08 MG/DL (ref 0.66–1.25)
GFR SERPL CREATININE-BSD FRML MDRD: >90 ML/MIN/{1.73_M2}
GLUCOSE SERPL-MCNC: 99 MG/DL (ref 70–99)
POTASSIUM SERPL-SCNC: 4.1 MMOL/L (ref 3.4–5.3)
SODIUM SERPL-SCNC: 137 MMOL/L (ref 133–144)
TROPONIN I SERPL-MCNC: 2.96 UG/L (ref 0–0.04)

## 2021-03-28 PROCEDURE — 210N000002 HC R&B HEART CARE

## 2021-03-28 PROCEDURE — 80048 BASIC METABOLIC PNL TOTAL CA: CPT | Performed by: INTERNAL MEDICINE

## 2021-03-28 PROCEDURE — 255N000002 HC RX 255 OP 636: Performed by: INTERNAL MEDICINE

## 2021-03-28 PROCEDURE — 99223 1ST HOSP IP/OBS HIGH 75: CPT | Mod: 25 | Performed by: INTERNAL MEDICINE

## 2021-03-28 PROCEDURE — 36415 COLL VENOUS BLD VENIPUNCTURE: CPT | Performed by: INTERNAL MEDICINE

## 2021-03-28 PROCEDURE — 93005 ELECTROCARDIOGRAM TRACING: CPT

## 2021-03-28 PROCEDURE — 250N000013 HC RX MED GY IP 250 OP 250 PS 637: Performed by: INTERNAL MEDICINE

## 2021-03-28 PROCEDURE — 93306 TTE W/DOPPLER COMPLETE: CPT | Mod: 26 | Performed by: INTERNAL MEDICINE

## 2021-03-28 PROCEDURE — 99232 SBSQ HOSP IP/OBS MODERATE 35: CPT | Performed by: INTERNAL MEDICINE

## 2021-03-28 PROCEDURE — 999N000208 ECHOCARDIOGRAM COMPLETE

## 2021-03-28 PROCEDURE — 93010 ELECTROCARDIOGRAM REPORT: CPT | Performed by: INTERNAL MEDICINE

## 2021-03-28 PROCEDURE — 84484 ASSAY OF TROPONIN QUANT: CPT | Performed by: INTERNAL MEDICINE

## 2021-03-28 RX ORDER — PANTOPRAZOLE SODIUM 20 MG/1
20 TABLET, DELAYED RELEASE ORAL
Status: DISCONTINUED | OUTPATIENT
Start: 2021-03-28 | End: 2021-03-29

## 2021-03-28 RX ORDER — COLCHICINE 0.6 MG/1
0.6 TABLET ORAL 2 TIMES DAILY
Status: DISCONTINUED | OUTPATIENT
Start: 2021-03-28 | End: 2021-03-29 | Stop reason: HOSPADM

## 2021-03-28 RX ADMIN — IBUPROFEN 600 MG: 600 TABLET, FILM COATED ORAL at 21:24

## 2021-03-28 RX ADMIN — COLCHICINE 0.6 MG: 0.6 TABLET, FILM COATED ORAL at 21:23

## 2021-03-28 RX ADMIN — PANTOPRAZOLE SODIUM 20 MG: 20 TABLET, DELAYED RELEASE ORAL at 08:51

## 2021-03-28 RX ADMIN — IBUPROFEN 600 MG: 600 TABLET, FILM COATED ORAL at 16:05

## 2021-03-28 RX ADMIN — HUMAN ALBUMIN MICROSPHERES AND PERFLUTREN 9 ML: 10; .22 INJECTION, SOLUTION INTRAVENOUS at 10:47

## 2021-03-28 RX ADMIN — COLCHICINE 0.6 MG: 0.6 TABLET, FILM COATED ORAL at 09:44

## 2021-03-28 RX ADMIN — IBUPROFEN 600 MG: 600 TABLET, FILM COATED ORAL at 08:51

## 2021-03-28 ASSESSMENT — ACTIVITIES OF DAILY LIVING (ADL)
ADLS_ACUITY_SCORE: 14

## 2021-03-28 NOTE — PROVIDER NOTIFICATION
MD Notification    Notified Person: MD    Notified Person Name: Dr Mcdonough    Notification Date/Time: 3/27/21 730 pm    Notification Interaction: paged    Purpose of Notification: 3rd troponin 5.58     Orders Received:    Comments:

## 2021-03-28 NOTE — PLAN OF CARE
A&Ox4. VSS on RA. Tele NSR. C/o 2/10 midsternal chest pain (unchanged from admission, MD aware). Scheduled Ibuprofen. Up independently. Regular diet. IV SL. Trops trending down. Plan for Cards consult and cardiac MRI Monday.

## 2021-03-28 NOTE — PROVIDER NOTIFICATION
MD Notification    Notified Person: MD Mcdonough    Notified Person Name:    Notification Date/Time: 03/27/21  7:44 PM      Notification Interaction:    Purpose of Notification: Norma Edge RN notified MD of [address] [place] [address] 5.58    Orders Received: Continue to monitor.     Comments:

## 2021-03-28 NOTE — PROGRESS NOTES
"[facility]    Medicine Progress Note - Hospitalist Service        Date of Admission:  3/27/2021  2:15 PM    Assessment & Plan:   Luis Galvez is a 30 year old male with no known medical problems was admitted on 3/27/2021 with acute pericarditis.    Acute elli-pericarditis, unspecified type  -Presents with chest pain and dyspnea and myalgias  -CTA negative for PE  -EKG with diffuse ST elevation, troponin elevated, peaked at 4.7  -Cardiology following  -Continue ibuprofen 600 mg 3 times daily  -Colchicine 0.6 mg twice daily for 3 months  -Protonix 20 mg p.o. daily  -Cardiac MRI tomorrow as patient had significant troponin elevation   -Echo today  -Clinically improving.        Diet: Combination Diet Regular Diet Adult     DVT Prophylaxis: Pneumatic Compression Devices   Arenas Catheter: not present  Code Status: Full Code     Disposition Plan    Expected discharge: Tomorrow, recommended to prior living arrangement after cardiac MRI completed    Entered: Glenroy Man MD 03/28/2021, 10:03 AM        The patient's care was discussed with the Bedside Nurse and Patient.    Glenroy Man MD  Hospitalist Service  [facility]    ______________________________________________________________________    Interval History   Minimal chest pain.  Denies dyspnea.  No nausea or vomiting.  Afebrile.  Overall improving.    Data reviewed today: I reviewed all medications, new labs and imaging results over the last 24 hours. I personally reviewed no images or EKG's today.    Physical Exam   Vital signs:  Temp: 98.3  F (36.8  C) Temp src: Oral BP: 135/88 Pulse: 83   Resp: 18 SpO2: 98 % O2 Device: None (Room air)     Weight: 108.5 kg (239 lb 1.6 oz)  Estimated body mass index is 31.55 kg/m  as calculated from the following:    Height as of 3/3/20: 1.854 m (6' 1\").    Weight as of this encounter: 108.5 kg (239 lb 1.6 oz).      Wt Readings from Last 2 Encounters:   03/28/21 108.5 kg (239 lb 1.6 oz) "   03/27/21 102.1 kg (225 lb)       Gen: AAOX3, NAD, comfortable  HEENT: Supple neck, moist oral mucosa, no pallor  Resp: CTA B/L, normal WOB, no crackles, no wheezes  CVS: RRR, no murmur  Abd/GI: Soft, non-tender. BS- normoactive.  No G/R/R  Skin: Warm, dry no rashes  MSK: No joint deformities, no pedal edema  Neuro- CN- intact. No focal deficits.       Data   Recent Labs   Lab 03/28/21  0540 03/28/21  0135 03/27/21  2117 03/27/21  1811 03/27/21  1433   WBC  --   --   --   --  10.2   HGB  --   --   --   --  13.7   MCV  --   --   --   --  86   PLT  --   --   --   --  241     --   --   --  138   POTASSIUM 4.1  --   --   --  3.5   CHLORIDE 107  --   --   --  105   CO2 29  --   --   --  30   BUN 13  --   --   --  12   CR 1.08  --   --   --  1.22   ANIONGAP 1*  --   --   --  3   FARTUN 8.6  --   --   --  9.0   GLC 99  --   --   --  99   ALBUMIN  --   --   --   --  3.7   PROTTOTAL  --   --   --   --  7.3   BILITOTAL  --   --   --   --  0.3   ALKPHOS  --   --   --   --  83   ALT  --   --   --   --  28   AST  --   --   --   --  31   TROPI  --  2.962* 4.346* 5.580* 4.700*       Recent Results (from the past 24 hour(s))   CT Chest Pulmonary Embolism w Contrast    Narrative    CT CHEST PULMONARY EMBOLISM WITH CONTRAST March 27, 2021 3:14 PM    CLINICAL HISTORY: Chest pain. Elevated troponin.    TECHNIQUE: CT angiogram chest during arterial phase injection IV  contrast. 2D and 3D MIP reconstructions were performed by the CT  technologist. Dose reduction techniques were used.   CONTRAST: 78 mL Isovue-370        COMPARISON: None.    FINDINGS:  ANGIOGRAM CHEST: Pulmonary arteries are normal caliber and negative  for pulmonary emboli. Thoracic aorta is negative for dissection.     LUNGS AND PLEURA: No pleural effusions. The lungs are clear.    MEDIASTINUM/AXILLAE: No enlarged lymph nodes are identified in the  chest. No pericardial effusion.    UPPER ABDOMEN: Limited views of the upper abdomen are  unremarkable.    MUSCULOSKELETAL: Unremarkable.      Impression    IMPRESSION: No acute abnormality in the chest. No evidence for  pulmonary embolism.    JOSE BOLDEN MD     Medications       colchicine  0.6 mg Oral BID     ibuprofen  600 mg Oral TID     pantoprazole  20 mg Oral QAM AC     sodium chloride (PF)  3 mL Intracatheter Q8H

## 2021-03-28 NOTE — PROGRESS NOTES
[facility]    Cardiology Consultation     Date of Admission:  3/27/2021    Assessment & Plan   Luis Galvez is a 30 year old male who was admitted on 3/27/2021. I was asked to see the patient for chest pain and diffuse ST elevation    Patient had chest pain and shortness of breath with chills and muscle aches since yesterday.  He originally was seen in urgent care however then he was seen in our emergency department.  Covid testing is negative.  He did receive the Covid vaccine 3 days ago.    EKG is consistent with possible pericarditis with diffuse ST elevation and very minimal PA depression.  Troponin is elevated at 2.97.    The patient is completely chest pain-free if he sets at a certain angle.  This is approximately 30 degrees in the bed.  If he lays flat he develops chest pain.  If he takes a deep breath he developed chest pain.    I was called emergently to the emergency department for evaluation of the patient given the ST elevation chest pain and troponin elevation.  Overall this is consistent with myopericarditis and not a acute ST elevation acute coronary syndrome.    Chest CT scan is negative for pulmonary embolism and D-dimer is normal.    CRP is elevated consistent with myopericarditis    I did a focused bedside ultrasound and there is no significant pericardial effusion.  There is no major abnormalities of his mitral or aortic valve.  There is no regional wall motion abnormalities and LVEF appears normal    The patient specifically denies cocaine or stimulant use.  He seems to be a reliable historian.    Overnight:  Chest pain almost complete resolved.  Troponin trending down.    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      Souleymane Jalloh MD     Physical Exam   Temp: 98.3  F (36.8  C) Temp src: Oral BP: 135/88 Pulse: 83   Resp: 18 SpO2: 98 % O2 Device: None (Room air)    Vital Signs with Ranges  Temp:  [98  F (36.7  C)-98.5  F (36.9  C)] 98.3  F (36.8  C)  Pulse:  [] 83  Resp:  [6-24] 18  BP: (101-147)/(60-91) 135/88  SpO2:  [96 %-98 %] 98 %  239 lbs 1.6 oz    GENERAL APPEARANCE: Alert and oriented x3. No acute distress.  SKIN: Inspection of the skin reveals no rashes, ulcerations, warm, dry  NECK: Supple and symmetric.   Normal JVP  LUNGS: Clear breath sounds throughout bilaterally, no wheezes, no rhonchi  CARDIOVASCULAR: S1, S2, regular rate and rhythm without any murmurs, gallops, rubs. The carotid pulses were normal and 2+ bilaterally without bruits. Peripheral pulses were 2+ and symmetric.  ABDOMEN: Soft, non-tender, non-distended with normal bowel sounds.  No ascites noted.  EXTREMITIES: No edema.  NEUROLOGIC:  Normal mood and affect.  Sensation to touch was normal.      Data   Results for orders placed or performed during the hospital encounter of 03/27/21 (from the past 24 hour(s))   CBC with platelets differential   Result Value Ref Range    WBC 10.2 4.0 - 11.0 10e9/L    RBC Count 4.76 4.4 - 5.9 10e12/L    Hemoglobin 13.7 13.3 - 17.7 g/dL    Hematocrit 40.9 40.0 - 53.0 %    MCV 86 78 - 100 fl    MCH 28.8 26.5 - 33.0 pg    MCHC 33.5 31.5 - 36.5 g/dL    RDW 11.9 10.0 - 15.0 %    Platelet Count 241 150 - 450 10e9/L    Diff Method Automated Method     % Neutrophils 71.5 %    % Lymphocytes 14.2 %    % Monocytes 12.6 %    % Eosinophils 1.0 %    % Basophils 0.3 %    % Immature Granulocytes 0.4 %    Nucleated RBCs 0 0 /100    Absolute Neutrophil 7.3 1.6 - 8.3 10e9/L    Absolute Lymphocytes 1.5  0.8 - 5.3 10e9/L    Absolute Monocytes 1.3 0.0 - 1.3 10e9/L    Absolute Eosinophils 0.1 0.0 - 0.7 10e9/L    Absolute Basophils 0.0 0.0 - 0.2 10e9/L    Abs Immature Granulocytes 0.0 0 - 0.4 10e9/L    Absolute Nucleated RBC 0.0    Comprehensive metabolic panel   Result Value Ref Range    Sodium 138 133 - 144 mmol/L    Potassium 3.5 3.4 - 5.3 mmol/L    Chloride 105 94 - 109 mmol/L    Carbon Dioxide 30 20 - 32 mmol/L    Anion Gap 3 3 - 14 mmol/L    Glucose 99 70 - 99 mg/dL    Urea Nitrogen 12 7 - 30 mg/dL    Creatinine 1.22 0.66 - 1.25 mg/dL    GFR Estimate 79 >60 mL/min/[1.73_m2]    GFR Estimate If Black >90 >60 mL/min/[1.73_m2]    Calcium 9.0 8.5 - 10.1 mg/dL    Bilirubin Total 0.3 0.2 - 1.3 mg/dL    Albumin 3.7 3.4 - 5.0 g/dL    Protein Total 7.3 6.8 - 8.8 g/dL    Alkaline Phosphatase 83 40 - 150 U/L    ALT 28 0 - 70 U/L    AST 31 0 - 45 U/L   Troponin I   Result Value Ref Range    Troponin I ES 4.700 (HH) 0.000 - 0.045 ug/L   CRP inflammation   Result Value Ref Range    CRP Inflammation 34.6 (H) 0.0 - 8.0 mg/L   Erythrocyte sedimentation rate auto   Result Value Ref Range    Sed Rate 14 0 - 15 mm/h   CT Chest Pulmonary Embolism w Contrast    Narrative    CT CHEST PULMONARY EMBOLISM WITH CONTRAST March 27, 2021 3:14 PM    CLINICAL HISTORY: Chest pain. Elevated troponin.    TECHNIQUE: CT angiogram chest during arterial phase injection IV  contrast. 2D and 3D MIP reconstructions were performed by the CT  technologist. Dose reduction techniques were used.   CONTRAST: 78 mL Isovue-370        COMPARISON: None.    FINDINGS:  ANGIOGRAM CHEST: Pulmonary arteries are normal caliber and negative  for pulmonary emboli. Thoracic aorta is negative for dissection.     LUNGS AND PLEURA: No pleural effusions. The lungs are clear.    MEDIASTINUM/AXILLAE: No enlarged lymph nodes are identified in the  chest. No pericardial effusion.    UPPER ABDOMEN: Limited views of the upper abdomen are unremarkable.    MUSCULOSKELETAL:  Unremarkable.      Impression    IMPRESSION: No acute abnormality in the chest. No evidence for  pulmonary embolism.    JOSE BOLDEN MD   Asymptomatic SARS-CoV-2 COVID-19 Virus (Coronavirus) by PCR    Specimen: Nasopharyngeal   Result Value Ref Range    SARS-CoV-2 Virus Specimen Source Nasopharyngeal     SARS-CoV-2 PCR Result NEGATIVE     SARS-CoV-2 PCR Comment       Testing was performed using the Xpert Xpress SARS-CoV-2 Assay on the Cepheid Gene-Xpert   Instrument Systems. Additional information about this Emergency Use Authorization (EUA)   assay can be found via the Lab Guide.     Troponin I   Result Value Ref Range    Troponin I ES 5.580 (HH) 0.000 - 0.045 ug/L   Troponin I   Result Value Ref Range    Troponin I ES 4.346 (HH) 0.000 - 0.045 ug/L   Troponin I   Result Value Ref Range    Troponin I ES 2.962 (HH) 0.000 - 0.045 ug/L   Basic metabolic panel   Result Value Ref Range    Sodium 137 133 - 144 mmol/L    Potassium 4.1 3.4 - 5.3 mmol/L    Chloride 107 94 - 109 mmol/L    Carbon Dioxide 29 20 - 32 mmol/L    Anion Gap 1 (L) 3 - 14 mmol/L    Glucose 99 70 - 99 mg/dL    Urea Nitrogen 13 7 - 30 mg/dL    Creatinine 1.08 0.66 - 1.25 mg/dL    GFR Estimate >90 >60 mL/min/[1.73_m2]    GFR Estimate If Black >90 >60 mL/min/[1.73_m2]    Calcium 8.6 8.5 - 10.1 mg/dL   EKG 12-lead, tracing only   Result Value Ref Range    Interpretation ECG Click View Image link to view waveform and result

## 2021-03-29 ENCOUNTER — APPOINTMENT (OUTPATIENT)
Dept: CARDIOLOGY | Facility: CLINIC | Age: 31
End: 2021-03-29
Attending: INTERNAL MEDICINE
Payer: COMMERCIAL

## 2021-03-29 VITALS
RESPIRATION RATE: 18 BRPM | OXYGEN SATURATION: 97 % | BODY MASS INDEX: 31.35 KG/M2 | HEART RATE: 90 BPM | TEMPERATURE: 98 F | WEIGHT: 237.6 LBS | DIASTOLIC BLOOD PRESSURE: 85 MMHG | SYSTOLIC BLOOD PRESSURE: 134 MMHG

## 2021-03-29 LAB
CRP SERPL-MCNC: 20.5 MG/L (ref 0–8)
INTERPRETATION ECG - MUSE: NORMAL

## 2021-03-29 PROCEDURE — 36415 COLL VENOUS BLD VENIPUNCTURE: CPT | Performed by: INTERNAL MEDICINE

## 2021-03-29 PROCEDURE — 250N000013 HC RX MED GY IP 250 OP 250 PS 637: Performed by: INTERNAL MEDICINE

## 2021-03-29 PROCEDURE — 75561 CARDIAC MRI FOR MORPH W/DYE: CPT | Mod: 26 | Performed by: INTERNAL MEDICINE

## 2021-03-29 PROCEDURE — 255N000002 HC RX 255 OP 636: Performed by: INTERNAL MEDICINE

## 2021-03-29 PROCEDURE — 86140 C-REACTIVE PROTEIN: CPT | Performed by: INTERNAL MEDICINE

## 2021-03-29 PROCEDURE — 99232 SBSQ HOSP IP/OBS MODERATE 35: CPT | Mod: 25 | Performed by: INTERNAL MEDICINE

## 2021-03-29 PROCEDURE — A9585 GADOBUTROL INJECTION: HCPCS | Performed by: INTERNAL MEDICINE

## 2021-03-29 PROCEDURE — 75561 CARDIAC MRI FOR MORPH W/DYE: CPT

## 2021-03-29 PROCEDURE — 99238 HOSP IP/OBS DSCHRG MGMT 30/<: CPT | Performed by: INTERNAL MEDICINE

## 2021-03-29 RX ORDER — DIAZEPAM 5 MG
5 TABLET ORAL
Status: DISCONTINUED | OUTPATIENT
Start: 2021-03-29 | End: 2021-03-29 | Stop reason: HOSPADM

## 2021-03-29 RX ORDER — COLCHICINE 0.6 MG/1
0.6 TABLET ORAL 2 TIMES DAILY
Qty: 60 TABLET | Refills: 1 | Status: SHIPPED | OUTPATIENT
Start: 2021-03-29 | End: 2021-04-07

## 2021-03-29 RX ORDER — GADOBUTROL 604.72 MG/ML
14 INJECTION INTRAVENOUS ONCE
Status: COMPLETED | OUTPATIENT
Start: 2021-03-29 | End: 2021-03-29

## 2021-03-29 RX ADMIN — IBUPROFEN 600 MG: 600 TABLET, FILM COATED ORAL at 08:09

## 2021-03-29 RX ADMIN — COLCHICINE 0.6 MG: 0.6 TABLET, FILM COATED ORAL at 08:09

## 2021-03-29 RX ADMIN — PANTOPRAZOLE SODIUM 20 MG: 20 TABLET, DELAYED RELEASE ORAL at 06:39

## 2021-03-29 RX ADMIN — GADOBUTROL 14 ML: 604.72 INJECTION INTRAVENOUS at 14:50

## 2021-03-29 ASSESSMENT — ACTIVITIES OF DAILY LIVING (ADL)
ADLS_ACUITY_SCORE: 14

## 2021-03-29 NOTE — PROGRESS NOTES
Completion of Chemotherapy: Monitoring during infusion done per policy, see Flowsheets. Blood return verified before, during, and after infusion per policy; no signs of extravasation. Pt tolerated chemotherapy well and without incident. Chemotherapy infusion end time on the [facility] Will continue to monitor. [facility]  Cardiology Progress Note    Date of Service (when I saw the patient): 03/29/2021  Primary Cardiologist: no previous cardiology care       Interval History:   Reports no recurrent chest discomfort.  Did have a few episodes of very brief palpitations and odd sensation when he lied on his side last night.  He denies lightheadedness, shortness of breath, or any other concerns.    ----------------------------------------------------------------------------------------    Assessment:  Luis Galvez is a 30 year old male who was admitted on 3/27/2021 with positional chest discomfort.  Chest CT ruled out PE and his Covid test was negative.  He reported chest discomfort when he lays flat and takes a deep breath.  His family history includes hypertension in his father.  He is a power .    # Positional chest pain (resolved)  -Troponin peak of 5.5 with downtrending pattern to 2.9  -ECG showed diffuse ST elevation with ST depression in the aVR lead with borderline TN elevation  -Echocardiogram showed LVEF of 55 to 60% with small area of moderate to severe apical wall hypokinesis   -CRP elevation was elevated at 34 which came down to 20 today  -He was presumed to have myopericarditis and was initiated on Motrin as well as colchicine therapy and was arranged for cardiac MRI today    # Hyperlipidemia  -Lipid panel 1 year ago shows LDL of 126, HDL of 36, total cholesterol of 229, triglycerides of 317    # Elevated BP   -- was controlled on 3/28 but seems to be high today; will continue to monitor  -- he does have fam hx of hypertension    ----------------------------------------------------------------------------------------    Plan:  --Cardiac MRI today  -- No heavy lifting for 3 months  -- Continue with colchicine for 3 month (0.6 mg BID)  -- CP resolved- no need for long NSAID therapy- should wean in 1 week; will also discuss NSAID versus aspirin with Dr. Carranza  -- Repeat echo in 4-6  weeks  -- Cardiology follow up in 2 weeks    ----------------------------------------------------------------------------------------  Physical Exam   Temp: 97.5  F (36.4  C) Temp src: Oral BP: (!) 148/93 Pulse: 74   Resp: 18 SpO2: 97 % O2 Device: None (Room air)    Vitals:    03/28/21 0624 03/29/21 0603   Weight: 108.5 kg (239 lb 1.6 oz) 107.8 kg (237 lb 9.6 oz)     GEN:  NAD  HEENT: Mucous membranes moist.  NECK:  No JVD.  C/V:  Regular rate and rhythm, no murmur, rub or gallop.   RESP: Respirations are unlabored. No use of accessory muscles. Clear to auscultation bilaterally without wheezing, rales, or rhonchi.  GI: Abdomen soft, nontender, nondistended.    EXTREM: No pitting LE edema.   NEURO: Alert and oriented, cooperative.   PSYCH: Normal affect.  SKIN: Warm and dry.   VASC: 2+ radial and dorsalis pedis pulses bilaterally.      Medications       colchicine  0.6 mg Oral BID     ibuprofen  600 mg Oral TID     pantoprazole  20 mg Oral QAM AC     sodium chloride (PF)  10 mL Intravenous Once     sodium chloride (PF)  3 mL Intracatheter Q8H       Data   Reviewed.     Tele: KAZ Rubio PA-C   3/29/2021  Pager: [phone]

## 2021-03-29 NOTE — PROGRESS NOTES
Dr. Carranza reviewed cardiac MRI. It confirms myopericarditis. Patient will continue with colchicine 0.6 mg BID. No NSAID use. Follow up in 6-8 weeks at which time he will be scheduled for another cardiac MRI. Patient OK for discharge to home.    Renee Rubio PA-C   3/29/2021  Pager: [phone]

## 2021-03-29 NOTE — PLAN OF CARE
VSS. Denies pain. Up independently. Discharge instructions and medications explained with teach back method. All belongings sent home with pt. His wife brought him home.

## 2021-03-29 NOTE — PLAN OF CARE
Pt stable over night, VSS, no c/o Chest pain or sob.  His rhythm has been in SB/SR.  Plan for today is a Cardiac MRI.  No new issues noted, Pt slept well.

## 2021-03-29 NOTE — PROGRESS NOTES
Cardiac Core protocol  T1 with and without FS  T2 with and without FS  Haste  Free breathing  Contrast administered per weight based protocol.  Per Dr Morgan

## 2021-03-29 NOTE — DISCHARGE SUMMARY
[facility]    Discharge Summary  Hospitalist    Date of Admission:  3/27/2021  Date of Discharge:  3/29/2021  Discharging Provider: Glenroy Man MD    Discharge Diagnoses      Acute elli-pericarditis, unspecified type    Hospital Course:    Luis Galvez is a 30 year old male with no known medical problems was admitted on 3/27/2021 with acute pericarditis.     Acute mild elli-pericarditis, unspecified type  -Presents with chest pain and dyspnea and myalgias  -CTA negative for PE  -EKG with diffuse ST elevation, troponin elevated, peaked at 4.7  -Cardiology followed  -ECHO- ejection fraction is estimated at 55-60%. There is moderate to severe apical wall hypokinesis. No evidence of left ventricular mass or tumors.   -cMRI was done- Late gadolinium enhancement imaging shows pericardial hyper-enhancement and small size basal inferolateral mid myocardial and distal lateral subepicardial delayed enhancements. Together these findings   are consistent with acute myopericarditis.   -Pain resolved therefore ibuprofendiscontinued  -Colchicine 0.6 mg twice daily for 4-6 weeks  -OP cardiology f/u, no heavy lifting for 3 months    Glenroy Man MD    Significant Results and Procedures   See below    Pending Results   See below  Unresulted Labs Ordered in the Past 30 Days of this Admission     No orders found from 2/25/2021 to 3/28/2021.          Code Status   Full Code       Primary Care Physician   [facility]    Physical Exam   Temp: 97.5  F (36.4  C) Temp src: Oral BP: (!) 148/93 Pulse: 74   Resp: 18 SpO2: 97 % O2 Device: None (Room air)      Constitutional: AAOX3, NAD  Respiratory: CTA B/L, Normal WOB  Cardiovascular: RRR, No murmur  GI: Soft, Non- tender, BS- normoactive  Neuro: CN- grossly intact, Motor strength 5/5 on all 4 extremities.     Discharge Disposition   Discharged to home  Condition at discharge: Stable    Consultations This Hospital Stay   CARDIOLOGY IP  CONSULT    Time Spent on this Encounter   I, Glenroy Man MD, personally saw the patient today and spent less than or equal to 30 minutes discharging this patient.    Discharge Orders      Follow-up and recommended labs and tests    Follow up with primary care provider, Carrington Bose, within 7 days for hospital follow- up.    Cardiology f/u in 1-2 weeks     Activity    Your activity upon discharge: activity as tolerated     Discharge Instructions    No heavy lifting for 3 months     Full Code     Diet    Follow this diet upon discharge: Orders Placed This Encounter      Regular Diet Adult       Discharge Medications   Current Discharge Medication List      START taking these medications    Details   colchicine (COLCYRS) 0.6 MG tablet Take 1 tablet (0.6 mg) by mouth 2 times daily  Qty: 60 tablet, Refills: 1    Comments: Future refills by cardiology/PCP Dr. Carrington Bose with phone number [phone].  Associated Diagnoses: Acute pericarditis, unspecified type         CONTINUE these medications which have NOT CHANGED    Details   cetirizine (ZYRTEC) 10 MG tablet Take 10 mg by mouth daily as needed for allergies      diphenhydrAMINE (BENADRYL) 25 MG tablet Take 25-50 mg by mouth every 8 hours as needed for itching or allergies         STOP taking these medications       ibuprofen (ADVIL/MOTRIN) 200 MG tablet Comments:   Reason for Stopping:             Allergies   Allergies   Allergen Reactions     Penicillins      Data   Most Recent 3 CBC's:  Recent Labs   Lab Test 03/27/21  1433   WBC 10.2   HGB 13.7   MCV 86         Most Recent 3 BMP's:  Recent Labs   Lab Test 03/28/21  0540 03/27/21  1433 03/03/20  1712    138  --    POTASSIUM 4.1 3.5  --    CHLORIDE 107 105  --    CO2 29 30  --    BUN 13 12  --    CR 1.08 1.22  --    ANIONGAP 1* 3  --    FARTUN 8.6 9.0  --    GLC 99 99 94     Most Recent 2 LFT's:  Recent Labs   Lab Test 03/27/21  1433   AST 31   ALT 28   ALKPHOS 83   BILITOTAL  0.3     Most Recent INR's and Anticoagulation Dosing History:  Anticoagulation Dose History     There is no flowsheet data to display.        Most Recent 3 Troponin's:  Recent Labs   Lab Test 21  0135 21  2117 21  1811   TROPI 2.962* 4.346* 5.580*     Most Recent Cholesterol Panel:  Recent Labs   Lab Test 20  1712   CHOL 225*   *   HDL 36*   TRIG 317*     Most Recent 6 Bacteria Isolates From Any Culture (See EPIC Reports for Culture Details):No lab results found.  Most Recent TSH, T4 and A1c Labs:No lab results found.    Results for orders placed or performed during the hospital encounter of 21   CT Chest Pulmonary Embolism w Contrast    Narrative    CT CHEST PULMONARY EMBOLISM WITH CONTRAST 2021 3:14 PM    CLINICAL HISTORY: Chest pain. Elevated troponin.    TECHNIQUE: CT angiogram chest during arterial phase injection IV  contrast. 2D and 3D MIP reconstructions were performed by the CT  technologist. Dose reduction techniques were used.   CONTRAST: 78 mL Isovue-370        COMPARISON: None.    FINDINGS:  ANGIOGRAM CHEST: Pulmonary arteries are normal caliber and negative  for pulmonary emboli. Thoracic aorta is negative for dissection.     LUNGS AND PLEURA: No pleural effusions. The lungs are clear.    MEDIASTINUM/AXILLAE: No enlarged lymph nodes are identified in the  chest. No pericardial effusion.    UPPER ABDOMEN: Limited views of the upper abdomen are unremarkable.    MUSCULOSKELETAL: Unremarkable.      Impression    IMPRESSION: No acute abnormality in the chest. No evidence for  pulmonary embolism.    JOSE BOLDEN MD   Echocardiogram Complete    Narrative    [id number]  [id number]  [id number]  [id number]^[facility]^[place]^[facility]  Echocardiography Laboratory  [address] [place] [address]     Name: JESSA DONG  MRN: [id number]  : 1990  Study Date: 2021 10:25 AM  Age: 30 yrs  Gender: Male  Patient Location:  [facility]  Reason For Study: Chest Pain  Ordering Physician: KELLEY JOHNSON  Referring Physician: Carrington Bose  Performed By: Kerline Ocampo     BSA: 2.3 m2  Height: 73 in  Weight: 239 lb  HR: 84  BP: 135/88 mmHg  ______________________________________________________________________________  Procedure  Complete Portable Echo Adult. Optison (NDC #[id number]) given intravenously.  ______________________________________________________________________________  Interpretation Summary     The visual ejection fraction is estimated at 55-60%.  There is small area of moderate to severe apical wall hypokinesis.  Contrast was used without apparent complications.  ______________________________________________________________________________  Left Ventricle  The left ventricle is normal in size. There is normal left ventricular wall  thickness. Left ventricular systolic function is normal. The visual ejection  fraction is estimated at 55-60%. Left ventricular diastolic function is  normal. There is moderate to severe apical wall hypokinesis. No evidence of  left ventricular mass or tumors.     Right Ventricle  The right ventricle is normal in structure, function and size.     Atria  Normal left atrial size. Right atrial size is normal.     Mitral Valve  The mitral valve leaflets appear normal. There is no evidence of stenosis,  fluttering, or prolapse.     Tricuspid Valve  Normal tricuspid valve.     Aortic Valve  Normal tricuspid aortic valve.     Pulmonic Valve  Normal pulmonic valve.     Vessels  The aortic root is normal size. The inferior vena cava is normal.     Pericardium  The pericardium appears normal.     Rhythm  Sinus rhythm was noted.  ______________________________________________________________________________  MMode/2D Measurements & Calculations  IVSd: 1.0 cm     LVIDd: 5.2 cm  LVIDs: 3.6 cm  LVPWd: 1.1 cm  FS: 30.8 %  LV mass(C)d: 200.7 grams  LV mass(C)dI: 86.5 grams/m2  Ao root diam: 3.6 cm  LA  dimension: 3.7 cm  asc Aorta Diam: 3.3 cm  LA/Ao: 1.0  LA Volume (BP): 37.5 ml  LA Volume Index (BP): 16.2 ml/m2  RWT: 0.41     Doppler Measurements & Calculations  MV E max siva: 79.7 cm/sec  MV A max siva: 46.7 cm/sec  MV E/A: 1.7  MV dec slope: 435.0 cm/sec2  PA acc time: 0.18 sec  PI end-d siva: 82.4 cm/sec  E/E' av.1  Lateral E/e': 5.5  Medial E/e': 6.6     ______________________________________________________________________________  Report approved by: Jose Mullen 2021 01:04 PM

## 2021-03-30 DIAGNOSIS — I30.9 ACUTE PERICARDITIS, UNSPECIFIED TYPE: Primary | ICD-10-CM

## 2021-03-30 DIAGNOSIS — I31.9 MYOPERICARDITIS: Primary | ICD-10-CM

## 2021-04-07 ENCOUNTER — PRE VISIT (OUTPATIENT)
Dept: CARDIOLOGY | Facility: CLINIC | Age: 31
End: 2021-04-07

## 2021-04-07 ENCOUNTER — OFFICE VISIT (OUTPATIENT)
Dept: CARDIOLOGY | Facility: CLINIC | Age: 31
End: 2021-04-07
Attending: INTERNAL MEDICINE
Payer: COMMERCIAL

## 2021-04-07 VITALS
SYSTOLIC BLOOD PRESSURE: 126 MMHG | OXYGEN SATURATION: 98 % | HEIGHT: 73 IN | DIASTOLIC BLOOD PRESSURE: 82 MMHG | HEART RATE: 72 BPM | WEIGHT: 237 LBS | BODY MASS INDEX: 31.41 KG/M2

## 2021-04-07 DIAGNOSIS — I30.9 ACUTE PERICARDITIS, UNSPECIFIED TYPE: ICD-10-CM

## 2021-04-07 PROCEDURE — G0463 HOSPITAL OUTPT CLINIC VISIT: HCPCS | Mod: 25

## 2021-04-07 PROCEDURE — 99203 OFFICE O/P NEW LOW 30 MIN: CPT | Mod: GC | Performed by: INTERNAL MEDICINE

## 2021-04-07 RX ORDER — METOPROLOL SUCCINATE 25 MG/1
25 TABLET, EXTENDED RELEASE ORAL DAILY
Qty: 90 TABLET | Refills: 3 | Status: SHIPPED | OUTPATIENT
Start: 2021-04-07 | End: 2022-10-13

## 2021-04-07 RX ORDER — LISINOPRIL 2.5 MG/1
2.5 TABLET ORAL DAILY
Qty: 90 TABLET | Refills: 3 | Status: SHIPPED | OUTPATIENT
Start: 2021-04-07 | End: 2022-10-13

## 2021-04-07 RX ORDER — MULTIPLE VITAMINS W/ MINERALS TAB 9MG-400MCG
1 TAB ORAL DAILY
COMMUNITY
End: 2022-10-13

## 2021-04-07 RX ORDER — COLCHICINE 0.6 MG/1
0.6 TABLET ORAL 2 TIMES DAILY
Qty: 180 TABLET | Refills: 0 | Status: SHIPPED | OUTPATIENT
Start: 2021-04-07 | End: 2021-07-27

## 2021-04-07 ASSESSMENT — PAIN SCALES - GENERAL: PAINLEVEL: NO PAIN (0)

## 2021-04-07 ASSESSMENT — MIFFLIN-ST. JEOR: SCORE: 2093.9

## 2021-04-07 NOTE — PROGRESS NOTES
Cardiology Clinic Note         Date of Service (when I saw the patient): 4/7/2021    ASSESSMENT:   Luis Galvez is a 30 year old male with no known medical problems was admitted on 3/27/2021 with acute pericarditis. Now presents for clinic follow up.      Acute mild elli-pericarditis, unspecified type  -ECHO- ejection fraction is estimated at 55-60%. There is moderate to severe apical wall hypokinesis. No evidence of left ventricular mass or tumors.   -cMRI was done- Late gadolinium enhancement imaging showed pericardial hyper-enhancement and small size basal inferolateral mid myocardial and distal lateral subepicardial delayed enhancements. Together these findings \are consistent with acute myopericarditis.   -Presented with chest pain and dyspnea and myalgias  -CTA negative for PE  -EKG with diffuse ST elevation, troponin elevated, peaked at 4.7  - would initiate low dose bblocker and ACE inhibitor   -Colchicine 0.6 mg twice daily for 3 months   -no exercise for 6 months     Gerald Pisano     Discussed with Dr Durbin     History of Present Illness   Patient is a pleasant 30-year-old  male,  by [employer], no pre-existing medical conditions or prescription medications, who developed pericarditic chest pain with the chills and mild myalgia 2 to 3 days following second dose of the Moderna Covid-19 vaccine.    Work-up revealed myopericarditis.       ECG shows diffuse ST elevation consistent with pericarditis, troponin I peaked at 5.5 and has subsequently down trended, inflammatory CRP 35, creatinine 1.0, sed rate 14, normal leukocyte count of 10.2.    Now patient chest pain free, he is able to walk for 30 minutes. Denied palpitations, syncope, orthopnea, PND. No MORAN.     Past Medical History    I have reviewed this patient's medical history and updated it with pertinent information if needed.   No past medical history on file.    Past Surgical History   I have reviewed this patient's  surgical history and updated it with pertinent information if needed.  Past Surgical History:   Procedure Laterality Date     CRYO-FOCAL RETINAL LASER OS (LEFT EYE) Left 2006     right knee arthroscopic surgery Right 2012       Prior to Admission Medications   Cannot display prior to admission medications because the patient has not been admitted in this contact.     Allergies   Allergies   Allergen Reactions     Penicillins        Social History   I have reviewed this patient's social history and updated it with pertinent information if needed. Luis Galvez  reports that he has never smoked. He has never used smokeless tobacco. He reports current alcohol use. He reports that he does not use drugs.    Family History   I have reviewed this patient's family history and updated it with pertinent information if needed.   Family History   Problem Relation Age of Onset     Hypertension Father        Review of Systems   The 10 point Review of Systems is negative other than noted in the HPI or here.     Physical Exam                      Vital Signs with Ranges  Pulse:  [72] 72  BP: (126)/(82) 126/82  SpO2:  [98 %] 98 %  237 lbs 0 oz    GEN: NAD, pleasant  HEENT: no icterus  CV: RRR, normal s1/s2, no murmurs/rubs/s3/s4, no heave. JVP < 7 cmH2O   CHEST: CTAB  ABD: soft, NT/ND, NABS  : no flank/suprapubic tenderness  PSYCH: cooperative, affect appropriate    Data     No results found for this or any previous visit (from the past 24 hour(s)).     3/29/21  [facility]  CONCLUSIONS:   1. Normal LV and RV systolic functions.  2. Late gadolinium enhancement imaging shows pericardial hyper-enhancement and small size basal  inferolateral mid myocardial and distal lateral subepicardial delayed enhancements. Together these findings  are consistent with acute myopericarditis.    3/28/21  TTE  The visual ejection fraction is estimated at 55-60%.  There is small area of moderate to severe apical wall hypokinesis.  Contrast was used without  apparent complications.

## 2021-04-07 NOTE — LETTER
4/7/2021      RE: Luis Galvez  [address]  [facility] [address]       Dear Colleague,    Thank you for the opportunity to participate in the care of your patient, Luis Galvez, at the [facility] [place] [facility]. Please see a copy of my visit note below.    Cardiology Clinic Note         Date of Service (when I saw the patient): 4/7/2021    ASSESSMENT:   Luis Galvez is a 30 year old male with no known medical problems was admitted on 3/27/2021 with acute pericarditis. Now presents for clinic follow up.      Acute mild elli-pericarditis, unspecified type  -ECHO- ejection fraction is estimated at 55-60%. There is moderate to severe apical wall hypokinesis. No evidence of left ventricular mass or tumors.   -cMRI was done- Late gadolinium enhancement imaging showed pericardial hyper-enhancement and small size basal inferolateral mid myocardial and distal lateral subepicardial delayed enhancements. Together these findings \are consistent with acute myopericarditis.   -Presented with chest pain and dyspnea and myalgias  -CTA negative for PE  -EKG with diffuse ST elevation, troponin elevated, peaked at 4.7  - would initiate low dose bblocker and ACE inhibitor   -Colchicine 0.6 mg twice daily for 3 months   -no exercise for 6 months     Gerald Pisano     Discussed with Dr Durbin     History of Present Illness   Patient is a pleasant 30-year-old  male,  by [employer], no pre-existing medical conditions or prescription medications, who developed pericarditic chest pain with the chills and mild myalgia 2 to 3 days following second dose of the Moderna Covid-19 vaccine.    Work-up revealed myopericarditis.       ECG shows diffuse ST elevation consistent with pericarditis, troponin I peaked at 5.5 and has subsequently down trended, inflammatory CRP 35, creatinine 1.0, sed rate 14, normal leukocyte count of  10.2.    Now patient chest pain free, he is able to walk for 30 minutes. Denied palpitations, syncope, orthopnea, PND. No MORAN.     Past Medical History    I have reviewed this patient's medical history and updated it with pertinent information if needed.   No past medical history on file.    Past Surgical History   I have reviewed this patient's surgical history and updated it with pertinent information if needed.  Past Surgical History:   Procedure Laterality Date     CRYO-FOCAL RETINAL LASER OS (LEFT EYE) Left 2006     right knee arthroscopic surgery Right 2012       Prior to Admission Medications   Cannot display prior to admission medications because the patient has not been admitted in this contact.     Allergies   Allergies   Allergen Reactions     Penicillins        Social History   I have reviewed this patient's social history and updated it with pertinent information if needed. Luis Galvez  reports that he has never smoked. He has never used smokeless tobacco. He reports current alcohol use. He reports that he does not use drugs.    Family History   I have reviewed this patient's family history and updated it with pertinent information if needed.   Family History   Problem Relation Age of Onset     Hypertension Father        Review of Systems   The 10 point Review of Systems is negative other than noted in the HPI or here.     Physical Exam                      Vital Signs with Ranges  Pulse:  [72] 72  BP: (126)/(82) 126/82  SpO2:  [98 %] 98 %  237 lbs 0 oz    GEN: NAD, pleasant  HEENT: no icterus  CV: RRR, normal s1/s2, no murmurs/rubs/s3/s4, no heave. JVP < 7 cmH2O   CHEST: CTAB  ABD: soft, NT/ND, NABS  : no flank/suprapubic tenderness  PSYCH: cooperative, affect appropriate    Data     No results found for this or any previous visit (from the past 24 hour(s)).     3/29/21  [facility]  CONCLUSIONS:   1. Normal LV and RV systolic functions.  2. Late gadolinium enhancement imaging shows pericardial  hyper-enhancement and small size basal  inferolateral mid myocardial and distal lateral subepicardial delayed enhancements. Together these findings  are consistent with acute myopericarditis.    3/28/21  TTE  The visual ejection fraction is estimated at 55-60%.  There is small area of moderate to severe apical wall hypokinesis.  Contrast was used without apparent complications.    Attestation signed by Preston Durbin MD at 4/7/2021  8:40 AM:  Physician Attestation   I, Preston Durbin MD, saw this patient with the resident and agree with the resident/fellow's findings and plan of care as documented in the note.      I personally reviewed vital signs, medications, labs, and imaging.    Key findings: 30 year old gentleman that presented with myopericarditis last month. His pain resolved on admission and NSAIDs were stopped. He continued colchicine. An echocardiogram showed apical wall motion abnormality but normal EF and a cardiac MRI a day later showed LGE ~4%. He also had a troponin elevation. He was discharged with follow imaging with cardiac MRI. He did have a second dose of the Moderna vaccine 3 days prior to the onset of his symptoms though it is not clear to me that this causal. He is a weight , active, and exercising. We discussed that he needs to abstain from heavy alcohol use (<1 drink per day), exercising, and NSAIDs for 3-6 months. I recommended six months, if he would like to resume exercising at 3 months, I would favor performing imaging and cardiac monitoring along with an exercise stress test prior to resuming exercise. We discussed continuing colchicine for 3 months. I also favor low dose BB and ACEI for him for six months given the LGE and wall motion abnormalities and troponin. They will call and schedule the MRI. Follow up at six months.    Preston Durbin MD  Date of Service (when I saw the patient): 04/07/21

## 2021-04-07 NOTE — PATIENT INSTRUCTIONS
You were seen today in the Cardiovascular Clinic at the [facility].   Cardiology Providers you saw during your visit:    Dr. Durbin    Recommendations:   Toprol-XL 25 mg Daily  Lisinopril 25 mg Daily  Continue the Colchicine for 3 months and then discontinue the medication    Follow-up:   6 month follow up    For emergencies call 911.     If you have any questions regarding your visit please contact your care team:     Mandi Newman RN  [facility]  Cardiology Care Coordinator-Interventional    Appointment scheduling or nurse questions: [phone]    On Call Cardiologist for after hours or on weekends: [phone]    option #4    If you need a medication refill please contact your pharmacy.  Please allow 3 business days for your refill to be completed.    As always, thank you for trusting us with your health care needs!

## 2021-04-07 NOTE — NURSING NOTE
Chief Complaint   Patient presents with     New Patient     follow up ED visit for pericarditis      Vitals were taken and medications were reconciled.     Anna Menon RMA  7:18 AM

## 2021-04-07 NOTE — NURSING NOTE
Pts plan of care per Dr. Durbin:    Recommendations:   Toprol-XL 25 mg Daily  Lisinopril 25 mg Daily  Continue the Colchicine for 3 months and then discontinue the medication    Follow-up:   6 month follow up    Orders placed and pt was walked to the pod for scheduling.  Daily Wong RN on 4/7/2021 at 8:28 AM

## 2021-04-25 ENCOUNTER — HEALTH MAINTENANCE LETTER (OUTPATIENT)
Age: 31
End: 2021-04-25

## 2021-05-21 ENCOUNTER — HOSPITAL ENCOUNTER (OUTPATIENT)
Dept: CARDIOLOGY | Facility: CLINIC | Age: 31
Discharge: HOME | End: 2021-05-21
Attending: PHYSICIAN ASSISTANT | Admitting: PHYSICIAN ASSISTANT
Payer: COMMERCIAL

## 2021-05-21 DIAGNOSIS — I31.9 MYOPERICARDITIS: ICD-10-CM

## 2021-05-21 PROCEDURE — 75561 CARDIAC MRI FOR MORPH W/DYE: CPT | Mod: 26 | Performed by: INTERNAL MEDICINE

## 2021-05-21 PROCEDURE — 75561 CARDIAC MRI FOR MORPH W/DYE: CPT

## 2021-05-21 PROCEDURE — A9585 GADOBUTROL INJECTION: HCPCS | Performed by: PHYSICIAN ASSISTANT

## 2021-05-21 PROCEDURE — 255N000002 HC RX 255 OP 636: Performed by: PHYSICIAN ASSISTANT

## 2021-05-21 RX ORDER — GADOBUTROL 604.72 MG/ML
0.1 INJECTION INTRAVENOUS ONCE
Status: DISCONTINUED | OUTPATIENT
Start: 2021-05-21 | End: 2021-05-21

## 2021-05-21 RX ORDER — GADOBUTROL 604.72 MG/ML
14 INJECTION INTRAVENOUS ONCE
Status: COMPLETED | OUTPATIENT
Start: 2021-05-21 | End: 2021-05-21

## 2021-05-21 RX ADMIN — GADOBUTROL 14 ML: 604.72 INJECTION INTRAVENOUS at 12:17

## 2021-05-25 ENCOUNTER — TELEPHONE (OUTPATIENT)
Dept: CARDIOLOGY | Facility: CLINIC | Age: 31
End: 2021-05-25

## 2021-05-25 NOTE — TELEPHONE ENCOUNTER
Reviewed results of cMR with patient  Discussed resuming exercise, no isometrics for now  Discussed drinking alcohol which was discouraged as it is a cardiac depressant.

## 2021-05-25 NOTE — TELEPHONE ENCOUNTER
[facility] Call Center    Phone Message    May a detailed message be left on voicemail: yes     Reason for Call: Other: Pt would like a call back regarding his test results     Action Taken: Message routed to:  [facility] Cardio    Travel Screening: Not Applicable

## 2021-07-27 DIAGNOSIS — I30.9 ACUTE PERICARDITIS, UNSPECIFIED TYPE: ICD-10-CM

## 2021-07-27 RX ORDER — COLCHICINE 0.6 MG/1
0.6 TABLET ORAL 2 TIMES DAILY
Qty: 180 TABLET | Refills: 0 | Status: SHIPPED | OUTPATIENT
Start: 2021-07-27 | End: 2022-10-13

## 2021-07-27 NOTE — TELEPHONE ENCOUNTER
colchicine (COLCYRS) 0.6 MG tablet    Last Written Prescription Date:  4/7/2021  Last Fill Quantity: 180,   # refills: 0  Last Office Visit : 4/7/2021  Future Office visit:  10/6/2021  180 Tabs sent to pharm 7/27/2021      Laina De Santiago RN  Central Triage Red Flags/Med Refills

## 2021-07-27 NOTE — TELEPHONE ENCOUNTER
Pt's wife is calling.  No consent on file.    Hospitalized in March for pericarditis and myocarditis.  He had his last dose of Colchicine today. He is still needing this refill.  Needs refill today as he is out. He thought that he had refills on it and just realized yesterday, that there was not any refills left.  I advised her of the 72 hr business refill policy. Refill has been submitted. I advised her that I would send a message to them, to refill quickly.    Leena Courtney RN  [facility] Nurse Advisor  7/27/2021 at 1:03 PM

## 2021-10-04 ENCOUNTER — OFFICE VISIT (OUTPATIENT)
Dept: CARDIOLOGY | Facility: CLINIC | Age: 31
End: 2021-10-04
Payer: COMMERCIAL

## 2021-10-04 VITALS
HEART RATE: 61 BPM | OXYGEN SATURATION: 100 % | DIASTOLIC BLOOD PRESSURE: 79 MMHG | BODY MASS INDEX: 29.82 KG/M2 | SYSTOLIC BLOOD PRESSURE: 130 MMHG | WEIGHT: 225 LBS | HEIGHT: 73 IN

## 2021-10-04 DIAGNOSIS — I30.9 ACUTE PERICARDITIS, UNSPECIFIED TYPE: Primary | ICD-10-CM

## 2021-10-04 DIAGNOSIS — I31.9 MYOPERICARDITIS: ICD-10-CM

## 2021-10-04 PROCEDURE — 99203 OFFICE O/P NEW LOW 30 MIN: CPT | Performed by: INTERNAL MEDICINE

## 2021-10-04 PROCEDURE — 93000 ELECTROCARDIOGRAM COMPLETE: CPT | Performed by: INTERNAL MEDICINE

## 2021-10-04 ASSESSMENT — MIFFLIN-ST. JEOR: SCORE: 2034.47

## 2021-10-04 NOTE — PROGRESS NOTES
HPI and Plan:     Luis Galvez is very pleasant 31-year-old comes in with his girlfriend today he 3 days after his second Covid vaccine shot came down with chest pain shortness of breath.  [facility] showing late gadolinium enhancement consistent with acute myopericarditis.  Troponins were positive.  Dr. Li which saw him in consultation placed him on colchicine beta-blockers and ACE inhibitors.  His follow-up MRI from May had normalized.  He has no symptoms.  He has been compliant with his medications.  He has no history of underlying hypertension or cardiac arrhythmias.  He denies any chest pain chest pressure shortness of breath heart racing palpitations or syncope.    I am recommending he discontinue colchicine.  I am recommending we start tapering his lisinopril and beta-blockers off of the next 2 weeks time.  I and read recommending that it is okay for him to gradually ease back into weightlifting and to please call us with any symptoms at all.  At this point time his exam EKG and MRI are all normal.  No evidence of any residual disease.  Follow-up back as needed.    Orders Placed This Encounter   Procedures     EKG 12-lead complete w/read - Clinics (performed today)     No orders of the defined types were placed in this encounter.    There are no discontinued medications.      Encounter Diagnoses   Name Primary?     Acute pericarditis, unspecified type Yes     Myopericarditis        CURRENT MEDICATIONS:  Current Outpatient Medications   Medication Sig Dispense Refill     cetirizine (ZYRTEC) 10 MG tablet Take 10 mg by mouth daily as needed for allergies       colchicine (COLCYRS) 0.6 MG tablet Take 1 tablet (0.6 mg) by mouth 2 times daily (Please keep visit for 10/6/2021 for further refills) Thank you 180 tablet 0     diphenhydrAMINE (BENADRYL) 25 MG tablet Take 25-50 mg by mouth every 8 hours as needed for itching or allergies       lisinopril (ZESTRIL) 2.5 MG  tablet Take 1 tablet (2.5 mg) by mouth daily 90 tablet 3     metoprolol succinate ER (TOPROL-XL) 25 MG 24 hr tablet Take 1 tablet (25 mg) by mouth daily 90 tablet 3     multivitamin w/minerals (MULTI-VITAMIN) tablet Take 1 tablet by mouth daily         ALLERGIES     Allergies   Allergen Reactions     Penicillins        PAST MEDICAL HISTORY:  History reviewed. No pertinent past medical history.    PAST SURGICAL HISTORY:  Past Surgical History:   Procedure Laterality Date     CRYO-FOCAL RETINAL LASER OS (LEFT EYE) Left 2006     right knee arthroscopic surgery Right 2012       FAMILY HISTORY:  Family History   Problem Relation Age of Onset     Hypertension Father        SOCIAL HISTORY:  Social History     Socioeconomic History     Marital status:      Spouse name: None     Number of children: None     Years of education: None     Highest education level: None   Occupational History     None   Tobacco Use     Smoking status: Never Smoker     Smokeless tobacco: Never Used   Substance and Sexual Activity     Alcohol use: Yes     Comment: 5 drinks a week      Drug use: Never     Sexual activity: Yes     Partners: Female   Other Topics Concern     None   Social History Narrative     None     Social Determinants of Health     Financial Resource Strain:      Difficulty of Paying Living Expenses:    Food Insecurity:      Worried About Running Out of Food in the Last Year:      Ran Out of Food in the Last Year:    Transportation Needs:      Lack of Transportation (Medical):      Lack of Transportation (Non-Medical):    Physical Activity:      Days of Exercise per Week:      Minutes of Exercise per Session:    Stress:      Feeling of Stress :    Social Connections:      Frequency of Communication with Friends and Family:      Frequency of Social Gatherings with Friends and Family:      Attends [employer] Services:      Active Member of Clubs or Organizations:      Attends Club or Organization Meetings:      Marital Status:  "   Intimate Partner Violence:      Fear of Current or Ex-Partner:      Emotionally Abused:      Physically Abused:      Sexually Abused:        Review of Systems:  Skin:  Negative     Eyes:  Positive for glasses  ENT:  Negative    Respiratory:  Negative    Cardiovascular:  Negative    Gastroenterology: Negative    Genitourinary:  Negative    Musculoskeletal:  Negative    Neurologic:  Negative    Psychiatric:  Negative    Heme/Lymph/Imm:  Positive for allergies  Endocrine:  Negative            Physical Exam:  Vitals: /79   Pulse 61   Ht 1.854 m (6' 1\")   Wt 102.1 kg (225 lb)   SpO2 100%   BMI 29.69 kg/m    Constitutional: cooperative, alert and oriented, well developed, well nourished, in no acute distress   Skin: warm and dry to the touch, no apparent skin lesions or masses noted   Head: normocephalic, no masses or lesions   Eyes: pupils equal and round, conjunctivae and lids unremarkable, sclera white, no xanthalasma, EOMS intact, no nystagmus   ENT: no pallor or cyanosis, dentition good   Neck: carotid pulses are full and equal bilaterally, JVP normal, no carotid bruit, no thyromegaly   Chest: normal breath sounds, clear to auscultation, normal A-P diameter, normal symmetry, normal respiratory excursion, no use of accessory muscles   Cardiac: regular rhythm, normal S1/S2, no S3 or S4, apical impulse not displaced, no murmurs, gallops or rubs no presence of murmur   Abdomen: abdomen soft, non-tender, BS normoactive, no mass, no HSM, no bruits   Vascular: pulses full and equal, no bruits auscultated   Extremities and Back: no deformities, clubbing, cyanosis, erythema observed   Neurological: affect appropriate, oriented to time, person and place     Recent Lab Results:  LIPID RESULTS:  Lab Results   Component Value Date    CHOL 225 (H) 03/03/2020    HDL 36 (L) 03/03/2020     (H) 03/03/2020    TRIG 317 (H) 03/03/2020       LIVER ENZYME RESULTS:  Lab Results   Component Value Date    AST 31 " 03/27/2021    ALT 28 03/27/2021       CBC RESULTS:  Lab Results   Component Value Date    WBC 10.2 03/27/2021    RBC 4.76 03/27/2021    HGB 13.7 03/27/2021    HCT 40.9 03/27/2021    MCV 86 03/27/2021    MCH 28.8 03/27/2021    MCHC 33.5 03/27/2021    RDW 11.9 03/27/2021     03/27/2021       BMP RESULTS:  Lab Results   Component Value Date     03/28/2021    POTASSIUM 4.1 03/28/2021    CHLORIDE 107 03/28/2021    CO2 29 03/28/2021    ANIONGAP 1 (L) 03/28/2021    GLC 99 03/28/2021    BUN 13 03/28/2021    CR 1.08 03/28/2021    GFRESTIMATED >90 03/28/2021    GFRESTBLACK >90 03/28/2021    FARTUN 8.6 03/28/2021        A1C RESULTS:  No results found for: A1C    INR RESULTS:  No results found for: INR        CC  No referring provider defined for this encounter.

## 2021-10-04 NOTE — LETTER
10/4/2021    [facility]  [address]  [facility] [address]    RE: Luis Galvez       Dear Colleague,    I had the pleasure of seeing Luis Galvez in the [facility].    HPI and Plan:     Luis Galvez is very pleasant 31-year-old comes in with his girlfriend today he 3 days after his second Covid vaccine shot came down with chest pain shortness of breath.  Subsequently got hospitalized at the [facility] an MRI showing late gadolinium enhancement consistent with acute myopericarditis.  Troponins were positive.  Dr. Li which saw him in consultation placed him on colchicine beta-blockers and ACE inhibitors.  His follow-up MRI from May had normalized.  He has no symptoms.  He has been compliant with his medications.  He has no history of underlying hypertension or cardiac arrhythmias.  He denies any chest pain chest pressure shortness of breath heart racing palpitations or syncope.    I am recommending he discontinue colchicine.  I am recommending we start tapering his lisinopril and beta-blockers off of the next 2 weeks time.  I and read recommending that it is okay for him to gradually ease back into weightlifting and to please call us with any symptoms at all.  At this point time his exam EKG and MRI are all normal.  No evidence of any residual disease.  Follow-up back as needed.    Orders Placed This Encounter   Procedures     EKG 12-lead complete w/read - Clinics (performed today)     No orders of the defined types were placed in this encounter.    There are no discontinued medications.      Encounter Diagnoses   Name Primary?     Acute pericarditis, unspecified type Yes     Myopericarditis        CURRENT MEDICATIONS:  Current Outpatient Medications   Medication Sig Dispense Refill     cetirizine (ZYRTEC) 10 MG tablet Take 10 mg by mouth daily as needed for allergies       colchicine (COLCYRS) 0.6 MG tablet Take 1 tablet (0.6 mg) by  mouth 2 times daily (Please keep visit for 10/6/2021 for further refills) Thank you 180 tablet 0     diphenhydrAMINE (BENADRYL) 25 MG tablet Take 25-50 mg by mouth every 8 hours as needed for itching or allergies       lisinopril (ZESTRIL) 2.5 MG tablet Take 1 tablet (2.5 mg) by mouth daily 90 tablet 3     metoprolol succinate ER (TOPROL-XL) 25 MG 24 hr tablet Take 1 tablet (25 mg) by mouth daily 90 tablet 3     multivitamin w/minerals (MULTI-VITAMIN) tablet Take 1 tablet by mouth daily         ALLERGIES     Allergies   Allergen Reactions     Penicillins        PAST MEDICAL HISTORY:  History reviewed. No pertinent past medical history.    PAST SURGICAL HISTORY:  Past Surgical History:   Procedure Laterality Date     CRYO-FOCAL RETINAL LASER OS (LEFT EYE) Left 2006     right knee arthroscopic surgery Right 2012       FAMILY HISTORY:  Family History   Problem Relation Age of Onset     Hypertension Father        SOCIAL HISTORY:  Social History     Socioeconomic History     Marital status:      Spouse name: None     Number of children: None     Years of education: None     Highest education level: None   Occupational History     None   Tobacco Use     Smoking status: Never Smoker     Smokeless tobacco: Never Used   Substance and Sexual Activity     Alcohol use: Yes     Comment: 5 drinks a week      Drug use: Never     Sexual activity: Yes     Partners: Female   Other Topics Concern     None   Social History Narrative     None     Social Determinants of Health     Financial Resource Strain:      Difficulty of Paying Living Expenses:    Food Insecurity:      Worried About Running Out of Food in the Last Year:      Ran Out of Food in the Last Year:    Transportation Needs:      Lack of Transportation (Medical):      Lack of Transportation (Non-Medical):    Physical Activity:      Days of Exercise per Week:      Minutes of Exercise per Session:    Stress:      Feeling of Stress :    Social Connections:      Frequency  "of Communication with Friends and Family:      Frequency of Social Gatherings with Friends and Family:      Attends [employer] Services:      Active Member of Clubs or Organizations:      Attends Club or Organization Meetings:      Marital Status:    Intimate Partner Violence:      Fear of Current or Ex-Partner:      Emotionally Abused:      Physically Abused:      Sexually Abused:        Review of Systems:  Skin:  Negative     Eyes:  Positive for glasses  ENT:  Negative    Respiratory:  Negative    Cardiovascular:  Negative    Gastroenterology: Negative    Genitourinary:  Negative    Musculoskeletal:  Negative    Neurologic:  Negative    Psychiatric:  Negative    Heme/Lymph/Imm:  Positive for allergies  Endocrine:  Negative            Physical Exam:  Vitals: /79   Pulse 61   Ht 1.854 m (6' 1\")   Wt 102.1 kg (225 lb)   SpO2 100%   BMI 29.69 kg/m    Constitutional: cooperative, alert and oriented, well developed, well nourished, in no acute distress   Skin: warm and dry to the touch, no apparent skin lesions or masses noted   Head: normocephalic, no masses or lesions   Eyes: pupils equal and round, conjunctivae and lids unremarkable, sclera white, no xanthalasma, EOMS intact, no nystagmus   ENT: no pallor or cyanosis, dentition good   Neck: carotid pulses are full and equal bilaterally, JVP normal, no carotid bruit, no thyromegaly   Chest: normal breath sounds, clear to auscultation, normal A-P diameter, normal symmetry, normal respiratory excursion, no use of accessory muscles   Cardiac: regular rhythm, normal S1/S2, no S3 or S4, apical impulse not displaced, no murmurs, gallops or rubs no presence of murmur   Abdomen: abdomen soft, non-tender, BS normoactive, no mass, no HSM, no bruits   Vascular: pulses full and equal, no bruits auscultated   Extremities and Back: no deformities, clubbing, cyanosis, erythema observed   Neurological: affect appropriate, oriented to time, person and place     Recent Lab " Results:  LIPID RESULTS:  Lab Results   Component Value Date    CHOL 225 (H) 03/03/2020    HDL 36 (L) 03/03/2020     (H) 03/03/2020    TRIG 317 (H) 03/03/2020       LIVER ENZYME RESULTS:  Lab Results   Component Value Date    AST 31 03/27/2021    ALT 28 03/27/2021       CBC RESULTS:  Lab Results   Component Value Date    WBC 10.2 03/27/2021    RBC 4.76 03/27/2021    HGB 13.7 03/27/2021    HCT 40.9 03/27/2021    MCV 86 03/27/2021    MCH 28.8 03/27/2021    MCHC 33.5 03/27/2021    RDW 11.9 03/27/2021     03/27/2021       BMP RESULTS:  Lab Results   Component Value Date     03/28/2021    POTASSIUM 4.1 03/28/2021    CHLORIDE 107 03/28/2021    CO2 29 03/28/2021    ANIONGAP 1 (L) 03/28/2021    GLC 99 03/28/2021    BUN 13 03/28/2021    CR 1.08 03/28/2021    GFRESTIMATED >90 03/28/2021    GFRESTBLACK >90 03/28/2021    FARTUN 8.6 03/28/2021        A1C RESULTS:  No results found for: A1C    INR RESULTS:  No results found for: INR    CC  No referring provider defined for this encounter.    Thank you for allowing me to participate in the care of your patient.    Sincerely,     TARUN WERNER MD     [facility]  cc:   No referring provider defined for this encounter.

## 2021-11-23 ENCOUNTER — CARE COORDINATION (OUTPATIENT)
Dept: CARDIOLOGY | Facility: CLINIC | Age: 31
End: 2021-11-23
Payer: COMMERCIAL

## 2021-12-19 NOTE — PROGRESS NOTES
Return telephone call made to Luis regarding his question of, is he OK and safe to proceed with a COVID 19 booster, given soon after a previous dose he developed myopericarditis. Dr. Durbin stated that his recommendation at this time is to not get a booster.     Previously Declined (within the last year)

## 2022-05-21 ENCOUNTER — HEALTH MAINTENANCE LETTER (OUTPATIENT)
Age: 32
End: 2022-05-21

## 2022-09-18 ENCOUNTER — HEALTH MAINTENANCE LETTER (OUTPATIENT)
Age: 32
End: 2022-09-18

## 2022-10-10 ASSESSMENT — ENCOUNTER SYMPTOMS
JOINT SWELLING: 0
WEAKNESS: 0
HEMATOCHEZIA: 0
FEVER: 0
PARESTHESIAS: 0
FREQUENCY: 0
DIARRHEA: 0
CHILLS: 0
HEMATURIA: 0
SORE THROAT: 0
HEADACHES: 0
COUGH: 0
DYSURIA: 0
MYALGIAS: 0
EYE PAIN: 0
ABDOMINAL PAIN: 0
PALPITATIONS: 0
NAUSEA: 0
CONSTIPATION: 0
ARTHRALGIAS: 0
HEARTBURN: 0
DIZZINESS: 0
NERVOUS/ANXIOUS: 0
SHORTNESS OF BREATH: 0

## 2022-10-13 ENCOUNTER — OFFICE VISIT (OUTPATIENT)
Dept: FAMILY MEDICINE | Facility: CLINIC | Age: 32
End: 2022-10-13
Payer: COMMERCIAL

## 2022-10-13 VITALS
DIASTOLIC BLOOD PRESSURE: 81 MMHG | RESPIRATION RATE: 16 BRPM | HEART RATE: 76 BPM | WEIGHT: 227 LBS | TEMPERATURE: 98.2 F | HEIGHT: 73 IN | SYSTOLIC BLOOD PRESSURE: 136 MMHG | BODY MASS INDEX: 30.09 KG/M2

## 2022-10-13 DIAGNOSIS — Z11.59 NEED FOR HEPATITIS C SCREENING TEST: ICD-10-CM

## 2022-10-13 DIAGNOSIS — Z86.79 HISTORY OF MYOCARDITIS: ICD-10-CM

## 2022-10-13 DIAGNOSIS — Z00.00 HEALTHCARE MAINTENANCE: Primary | ICD-10-CM

## 2022-10-13 PROBLEM — I30.9 ACUTE PERICARDITIS, UNSPECIFIED TYPE: Status: RESOLVED | Noted: 2021-03-27 | Resolved: 2022-10-13

## 2022-10-13 LAB
CHOLEST SERPL-MCNC: 197 MG/DL
GLUCOSE BLD-MCNC: 79 MG/DL (ref 60–99)
HCV AB SERPL QL IA: NONREACTIVE
HDLC SERPL-MCNC: 36 MG/DL
HGB BLD-MCNC: 15.6 G/DL (ref 13.3–17.7)
LDLC SERPL CALC-MCNC: 125 MG/DL
NONHDLC SERPL-MCNC: 161 MG/DL
TRIGL SERPL-MCNC: 178 MG/DL

## 2022-10-13 PROCEDURE — 36415 COLL VENOUS BLD VENIPUNCTURE: CPT | Performed by: FAMILY MEDICINE

## 2022-10-13 PROCEDURE — 99395 PREV VISIT EST AGE 18-39: CPT | Performed by: FAMILY MEDICINE

## 2022-10-13 PROCEDURE — 85018 HEMOGLOBIN: CPT | Performed by: FAMILY MEDICINE

## 2022-10-13 PROCEDURE — 82947 ASSAY GLUCOSE BLOOD QUANT: CPT | Performed by: FAMILY MEDICINE

## 2022-10-13 PROCEDURE — 80061 LIPID PANEL: CPT | Performed by: FAMILY MEDICINE

## 2022-10-13 PROCEDURE — 86803 HEPATITIS C AB TEST: CPT | Performed by: FAMILY MEDICINE

## 2022-10-13 NOTE — PROGRESS NOTES
Adult Male Physical  A/P  History of myocarditis  Completely resolved, off of all medications but should not get further COVID vaccination    Healthcare maintenance  Healthcare maintenance assessment  Immunization-unable to get COVID vaccines, has had flu  Cancer screening-no family history  Vascular-glucose, lipid which is fasting.  Has had some significant change in visual acuity, some concern of optometrist for diabetes  \*               HPI  Current Concerns/ Questions  32-year-old, new to our clinic.  Here for physical exam.  Healthy.  Family history of hypertension in father.  History of myocarditis related to COVID-vaccine last year.  PFSH:  Current medications reviewed as follows:  cetirizine (ZYRTEC) 10 MG tablet, Take 10 mg by mouth daily as needed for allergies (Patient not taking: Reported on 10/13/2022)  colchicine (COLCYRS) 0.6 MG tablet, Take 1 tablet (0.6 mg) by mouth 2 times daily (Please keep visit for 10/6/2021 for further refills) Thank you  diphenhydrAMINE (BENADRYL) 25 MG tablet, Take 25-50 mg by mouth every 8 hours as needed for itching or allergies  lisinopril (ZESTRIL) 2.5 MG tablet, Take 1 tablet (2.5 mg) by mouth daily  metoprolol succinate ER (TOPROL-XL) 25 MG 24 hr tablet, Take 1 tablet (25 mg) by mouth daily  multivitamin w/minerals (THERA-VIT-M) tablet, Take 1 tablet by mouth daily (Patient not taking: Reported on 10/13/2022)    No current facility-administered medications on file prior to visit.     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     Past Surgical History:   Procedure Laterality Date     CRYO-FOCAL RETINAL LASER OS (LEFT EYE) Left 2006     EYE SURGERY  2005    Laser surgery for retina detachment     ORTHOPEDIC SURGERY  2012    meniscus removal in right knee     right knee arthroscopic surgery Right 2012     Family History   Problem  "Relation Age of Onset     Hypertension Father      History   Smoking Status     Never   Smokeless Tobacco     Never       Social History     Social History Narrative     Not on file     Immunization History   Administered Date(s) Administered     COVID-19,PF,Moderna 02/25/2021, 03/24/2021     Flu, Unspecified 09/26/2015, 09/10/2022     HepA-Adult 03/13/2019, 03/03/2020     HepB, Unspecified 06/03/2004, 07/07/2004, 12/13/2004     Hib, Unspecified 1991, 02/07/1992, 04/09/1993     Historical DTP/aP 1991, 02/07/1992, 04/14/1992, 04/09/1993, 08/20/1996     Influenza (H1N1) 02/04/2010     Influenza Quad, Recombinant, pf(RIV4) (Flublok) 10/07/2020     Influenza Vaccine IM > 6 months Valent IIV4 (Alfuria,Fluzone) 09/26/2021     Influenza Vaccine, 6+MO IM (QUADRIVALENT W/PRESERVATIVES) 10/12/2017, 10/01/2018, 10/07/2019     MMR 01/12/1993, 04/19/1993, 07/07/2004     Measles 01/12/1993     Polio, Unspecified  1991, 02/07/1992, 04/09/1993, 08/20/1996     TDAP Vaccine (Adacel) 03/13/2019     Td (Adult), Adsorbed 07/07/2004     Typhoid IM 03/13/2019     Yellow Fever, Live (Stamaril) 03/13/2019     Body mass index is 29.6 kg/m .    ROS  Negative    Objective  Physical Exam  Vitals:    10/13/22 0753   BP: 136/81   BP Location: Right arm   Patient Position: Sitting   Cuff Size: Adult Large   Pulse: 76   Resp: 16   Temp: 98.2  F (36.8  C)   Weight: 103 kg (227 lb)   Height: 1.865 m (6' 1.43\")       Gen- alert and oriented x3, no acute distress  HEENT- Normocephalic atraumatic   pupils equal and reactive, EOMI.    TMs visualized and normal, ear canals normal.    Mouth moist with normal mucosa no ulceration, dentition normal or in good repair.  Neck- supple, no adenopathy or thyromegaly  Chest- Normal chest wall apperance, normal inspiration and expiration.  Clear to asculation.  CV- Regular rate and rhythm, normal tones, no murmus, gallops or rubs.  Abd-  Soft, nodistended, nontender.  Normal bowel sounds, no mass " or organ enlargement.  Genitalia- normal penis, scrotum, testicles.  No hernia  Ext- Atraumatic,  No synovial thickening. Good perfusion, no edema. Periph pulses detected  Skin- warm and dry, no rash  Neuro- Cranial nerves grossly intact.  Normal gait, normal strength    Diagnostics  Pending                Answers for HPI/ROS submitted by the patient on 10/10/2022  Frequency of exercise:: 6-7 days/week  Getting at least 3 servings of Calcium per day:: Yes  Diet:: Regular (no restrictions)  Taking medications regularly:: Yes  Medication side effects:: None  Bi-annual eye exam:: Yes  Dental care twice a year:: Yes  Sleep apnea or symptoms of sleep apnea:: None  abdominal pain: No  Blood in stool: No  Blood in urine: No  chest pain: No  chills: No  congestion: No  constipation: No  cough: No  diarrhea: No  dizziness: No  ear pain: No  eye pain: No  nervous/anxious: No  fever: No  frequency: No  genital sores: No  headaches: No  hearing loss: No  heartburn: No  arthralgias: No  joint swelling: No  peripheral edema: No  mood changes: No  myalgias: No  nausea: No  dysuria: No  palpitations: No  Skin sensation changes: No  sore throat: No  urgency: No  rash: No  shortness of breath: No  visual disturbance: No  weakness: No  impotence: No  penile discharge: No  Additional concerns today:: No  Duration of exercise:: Greater than 60 minutes

## 2022-10-13 NOTE — ASSESSMENT & PLAN NOTE
Healthcare maintenance assessment  Immunization-unable to get COVID vaccines, has had flu  Cancer screening-no family history  Vascular-glucose, lipid which is fasting.  Has had some significant change in visual acuity, some concern of optometrist for diabetes  \*

## 2022-12-28 NOTE — PROGRESS NOTES
CHIEF COMPLAINT:   Chief Complaint   Patient presents with     Right Shoulder - Pain   .    HISTORY:  Luis Galvez is a 32 year old male, right  -hand dominant, who is seen as self referral for right shoulder pain that started 12/21/22. 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 While doing lat pull downs he felt posterior shoulder/back/neck pain. He rested for about a week and felt a bit better. Went back to train on 12/27/22, while doing bench press the pain returned worse then before. Denies prior right upper extremity injuries or surgeries. Denies numbness or tingling.     Pain severity: 2/10 at total rest. 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    He has no past medical history of Arthritis, Cancer (H), Cerebral infarction (H), Congestive heart failure (H), COPD (chronic obstructive pulmonary disease) (H), Depressive disorder, Diabetes (H), History of blood transfusion, Hypertension, Thyroid disease, or Uncomplicated asthma.    Surgical Hx:  has a past surgical history that includes Cryo-Focal Retinal Laser OS (left eye) (Left, 2006); right knee arthroscopic surgery (Right, 2012); Eye surgery (2005); and orthopedic surgery (2012).    Medications: No current outpatient medications on file.    Allergies:   Allergies   Allergen Reactions     Penicillins        Social Hx:  reports that he has never smoked. He has never used smokeless tobacco. He reports current alcohol use. He reports that he does not use drugs.    Family Hx: family history includes Hypertension in his father.. Negative for  "bleeding/clotting disorders. Negative for adverse anesthesia reactions.    REVIEW OF SYSTEMS: 10 point ROS neg other than the symptoms noted above in the HPI and PMH. Notables include  CONSTITUTIONAL:NEGATIVE for fever, chills, change in weight  INTEGUMENTARY/SKIN: NEGATIVE for worrisome rashes, moles or lesions  MUSCULOSKELETAL:See HPI above  NEURO: NEGATIVE for weakness, dizziness or paresthesias    PHYSICAL EXAM:  Ht 1.854 m (6' 1\")   Wt 99.8 kg (220 lb)   BMI 29.03 kg/m     GENERAL APPEARANCE: healthy, alert, no distress  SKIN: no suspicious lesions or rashes  NEURO: Normal strength and tone, mentation intact and speech normal  PSYCH:  mentation appears normal and affect normal/bright, not anxious  RESPIRATORY: No increased work of breathing.  VASCULAR: Radial pulses 2+ and brisk cappillary refill   HANDS: no clubbing or nail pitting, no nodes    MUSCULOSKELETAL:    NECK:  Cervical range of motion: limited, but able in all directions. causes pain in neck  Posterior cervical spine nontender to palpation over midline bony prominences  There is not tenderness to palpation along neck paraspinals and trapezius muscles  No palpable cervical lymphadenopathy.  Sensory deficits:  none  Motor deficits:  none  DTR's:  normal    RIGHT UPPER EXTREMITY:  Sensation intact to light touch in median, radial, ulnar and axillary nerve distributions  Palpable 2+ radial pulse, brisk capillary refill to all fingers, wwp  Intact epl fpl fdp edc wrist flexion/extension biceps triceps deltoid    RIGHT SHOULDER:  Shoulder Inspection: no swelling, bruising, discoloration, or obvious deformity or asymmetry  Tender: upper trapezius muscle, scapular spine  Range of Motion:   Active:forward flexion 175, external rotation  65, internal rotation  T1  Strength: forward flexion 5/5, External rotation 5/5  Liftoff: Able  Impingement: Guzman: neg, Neer: neg    LEFT UPPER EXTREMITY:  Sensation intact to light touch in median, radial, ulnar and " axillary nerve distributions  Palpable 2+ radial pulse, brisk capillary refill to all fingers, wwp  Intact epl fpl fdp edc wrist flexion/extension biceps triceps deltoid    LEFT SHOULDER:  Shoulder Inspection: no swelling, bruising, discoloration, or obvious deformity or asymmetry  Range of Motion:   Active:forward flexion 175, external rotation  65, internal rotation  T1  Strength: forward flexion 5/5, External rotation 5/5  Liftoff: Able  Impingement: Guzman: neg, Neer: neg    X-RAY INTERPRETATION: I ordered and was hoping for 3 views of the right shoulder. X-ray was not available at my clinic location today, so this unfortunately couldn't happen.    ASSESSMENT: Luis Galvez is a 32 year old male, right  -hand dominant with right trapezius strain    PLAN:  * Rest  * Activity modification - avoid activities that aggravate symptoms or started symptoms at onset.  * NSAIDS - regular use for inflammation, with food, as long as no contra-indications. Please discuss with pcp if needed. 800mg ibuprofen every 8 hours for 7 days  * Ice twice daily to three times daily, 15-20 minutes at a time  * heat may be beneficial prior to exercising  * Physical Therapy ordered for strengthening, stretching and range of motion exercises of rotator cuff and periscapular stabilization.  * Voltaren gel, 4x daily    * Return to clinic 4-6 weeks    Esteban Law PA-C, CAQ-OS  Dept. of Orthopedic Surgery  [facility]

## 2022-12-30 ENCOUNTER — OFFICE VISIT (OUTPATIENT)
Dept: ORTHOPEDICS | Facility: CLINIC | Age: 32
End: 2022-12-30
Payer: COMMERCIAL

## 2022-12-30 VITALS — WEIGHT: 220 LBS | BODY MASS INDEX: 29.16 KG/M2 | HEIGHT: 73 IN

## 2022-12-30 DIAGNOSIS — M25.511 ACUTE PAIN OF RIGHT SHOULDER: ICD-10-CM

## 2022-12-30 DIAGNOSIS — S46.811A STRAIN OF RIGHT TRAPEZIUS MUSCLE, INITIAL ENCOUNTER: Primary | ICD-10-CM

## 2022-12-30 PROCEDURE — 99204 OFFICE O/P NEW MOD 45 MIN: CPT | Performed by: PHYSICIAN ASSISTANT

## 2022-12-30 ASSESSMENT — PAIN SCALES - GENERAL: PAINLEVEL: MODERATE PAIN (5)

## 2022-12-30 NOTE — LETTER
12/30/2022         RE: Luis Galvez  [address]  [facility] [address]        Dear Colleague,    Thank you for referring your patient, Luis Galvez, to [facility]. Please see a copy of my visit note below.    CHIEF COMPLAINT:   Chief Complaint   Patient presents with     Right Shoulder - Pain   .    HISTORY:  Luis Galvez is a 32 year old male, right  -hand dominant, who is seen as self referral for right shoulder pain that started 12/21/22. 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 While doing lat pull downs he felt posterior shoulder/back/neck pain. He rested for about a week and felt a bit better. Went back to train on 12/27/22, while doing bench press the pain returned worse then before. Denies prior right upper extremity injuries or surgeries. Denies numbness or tingling.     Pain severity: 2/10 at total rest. 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    He has no past medical history of Arthritis, Cancer (H), Cerebral infarction (H), Congestive heart failure (H), COPD (chronic obstructive pulmonary disease) (H), Depressive disorder, Diabetes (H), History of blood transfusion, Hypertension, Thyroid disease, or Uncomplicated asthma.    Surgical Hx:  has a past surgical history that includes Cryo-Focal Retinal Laser OS (left eye) (Left, 2006); right knee arthroscopic surgery (Right, 2012); Eye surgery (2005); and orthopedic surgery (2012).    Medications: No current outpatient medications on file.    Allergies:   Allergies   Allergen Reactions      "Penicillins        Social Hx:  reports that he has never smoked. He has never used smokeless tobacco. He reports current alcohol use. He reports that he does not use drugs.    Family Hx: family history includes Hypertension in his father.. Negative for bleeding/clotting disorders. Negative for adverse anesthesia reactions.    REVIEW OF SYSTEMS: 10 point ROS neg other than the symptoms noted above in the HPI and PMH. Notables include  CONSTITUTIONAL:NEGATIVE for fever, chills, change in weight  INTEGUMENTARY/SKIN: NEGATIVE for worrisome rashes, moles or lesions  MUSCULOSKELETAL:See HPI above  NEURO: NEGATIVE for weakness, dizziness or paresthesias    PHYSICAL EXAM:  Ht 1.854 m (6' 1\")   Wt 99.8 kg (220 lb)   BMI 29.03 kg/m     GENERAL APPEARANCE: healthy, alert, no distress  SKIN: no suspicious lesions or rashes  NEURO: Normal strength and tone, mentation intact and speech normal  PSYCH:  mentation appears normal and affect normal/bright, not anxious  RESPIRATORY: No increased work of breathing.  VASCULAR: Radial pulses 2+ and brisk cappillary refill   HANDS: no clubbing or nail pitting, no nodes    MUSCULOSKELETAL:    NECK:  Cervical range of motion: limited, but able in all directions. causes pain in neck  Posterior cervical spine nontender to palpation over midline bony prominences  There is not tenderness to palpation along neck paraspinals and trapezius muscles  No palpable cervical lymphadenopathy.  Sensory deficits:  none  Motor deficits:  none  DTR's:  normal    RIGHT UPPER EXTREMITY:  Sensation intact to light touch in median, radial, ulnar and axillary nerve distributions  Palpable 2+ radial pulse, brisk capillary refill to all fingers, wwp  Intact epl fpl fdp edc wrist flexion/extension biceps triceps deltoid    RIGHT SHOULDER:  Shoulder Inspection: no swelling, bruising, discoloration, or obvious deformity or asymmetry  Tender: upper trapezius muscle, scapular spine  Range of Motion:   Active:forward " flexion 175, external rotation  65, internal rotation  T1  Strength: forward flexion 5/5, External rotation 5/5  Liftoff: Able  Impingement: Guzman: neg, Neer: neg    LEFT UPPER EXTREMITY:  Sensation intact to light touch in median, radial, ulnar and axillary nerve distributions  Palpable 2+ radial pulse, brisk capillary refill to all fingers, wwp  Intact epl fpl fdp edc wrist flexion/extension biceps triceps deltoid    LEFT SHOULDER:  Shoulder Inspection: no swelling, bruising, discoloration, or obvious deformity or asymmetry  Range of Motion:   Active:forward flexion 175, external rotation  65, internal rotation  T1  Strength: forward flexion 5/5, External rotation 5/5  Liftoff: Able  Impingement: Guzman: neg, Neer: neg    X-RAY INTERPRETATION: I ordered and was hoping for 3 views of the right shoulder. X-ray was not available at my clinic location today, so this unfortunately couldn't happen.    ASSESSMENT: Luis Galvez is a 32 year old male, right  -hand dominant with right trapezius strain    PLAN:  * Rest  * Activity modification - avoid activities that aggravate symptoms or started symptoms at onset.  * NSAIDS - regular use for inflammation, with food, as long as no contra-indications. Please discuss with pcp if needed. 800mg ibuprofen every 8 hours for 7 days  * Ice twice daily to three times daily, 15-20 minutes at a time  * heat may be beneficial prior to exercising  * Physical Therapy ordered for strengthening, stretching and range of motion exercises of rotator cuff and periscapular stabilization.  * Voltaren gel, 4x daily    * Return to clinic 4-6 weeks    Esteban Law PA-C, CAQ-OS  Dept. of Orthopedic Surgery  [facility]        Again, thank you for allowing me to participate in the care of your patient.        Sincerely,        AMANDA Street

## 2023-01-04 ENCOUNTER — THERAPY VISIT (OUTPATIENT)
Dept: PHYSICAL THERAPY | Facility: CLINIC | Age: 33
End: 2023-01-04
Attending: PHYSICIAN ASSISTANT
Payer: COMMERCIAL

## 2023-01-04 DIAGNOSIS — M54.2 CERVICAL PAIN: ICD-10-CM

## 2023-01-04 DIAGNOSIS — M25.511 ACUTE PAIN OF RIGHT SHOULDER: ICD-10-CM

## 2023-01-04 DIAGNOSIS — S46.811A STRAIN OF RIGHT TRAPEZIUS MUSCLE, INITIAL ENCOUNTER: ICD-10-CM

## 2023-01-04 DIAGNOSIS — M25.511 SHOULDER PAIN, RIGHT: ICD-10-CM

## 2023-01-04 PROCEDURE — 97110 THERAPEUTIC EXERCISES: CPT | Mod: GP | Performed by: PHYSICAL THERAPIST

## 2023-01-04 PROCEDURE — 97161 PT EVAL LOW COMPLEX 20 MIN: CPT | Mod: GP | Performed by: PHYSICAL THERAPIST

## 2023-01-04 NOTE — PROGRESS NOTES
Physical Therapy Initial Evaluation  Subjective:  The history is provided by the patient. No  was used.   Patient Health History  Luis Galvez being seen for Right Trap strain.     Problem began: 12/21/2022.   Problem occurred: Lifting weights   Pain is reported as 1/10 on pain scale.  General health as reported by patient is excellent.  Pertinent medical history includes: none.   Red flags:  None as reported by patient.  Medical allergies: none.   Surgeries include:  Other. Other surgery history details: Arthroscopic Knee.    Current medications:  Anti-inflammatory.    Current occupation is  - [employer].   Primary job tasks include:  Computer work.                  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 While doing lat pull downs he felt posterior shoulder/back/neck pain. He rested for about a week and felt a bit better. Went back to train on 12/27/22, while doing bench press the pain returned worse then before.   Patient reports pain in the medial right scapular border.  Radiates into the spine.  Patient reports limited neck movement (mostly rotation).  Had some trouble bearing weight.     Previous pain in the same area.  Seems recurrent (2-3 times per week).  Work: sit to stand (stand 1.5 hour per day.  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    Hobbies: power lifting, roller coasters, camping..         Type of problem:  Right shoulder.    This is a new condition.    Where condition occurred: at home.                                         Objective:  System              Cervical/Thoracic Evaluation    Headaches: none  Cervical Myotomes:  Cervical myotomes: Very slight right tricpes weakness.  Strength increased following repeated cervical retraction/extension.                              Spinal Segmental Conclusions:      Level:  Hypo at C4, C5, C6, C7, T1,  T2, T3, T4 and T5           Shoulder Evaluation:  ROM:  AROM:  normal                            PROM:  normal                                Strength:  normal                          Palpation:      Right shoulder tenderness present at: Rhomboids and Upper Trap                                     Luigi Cervical Evaluation    Posture:  Sitting: fair  Standing: fair  Protruding Head: no  Wry Neck: no      Movement Loss:  Protrusion (PRO): min  Flexion (Flex): min  Retraction (RET): min and pain  Extension (EXT): min and pain  Lateral Flexion Right (LF R): nil  Lateral Flexion Left (LF L): nil and pain  Rotation Right (ROT R): min and pain  Rotation Left (ROT L): min and pain  Test Movements:        RET EXT: During: produces  After: no worse  Mechanical Response: no effect  Repeat RET EXT: During: decreases  After: better  Mechanical Response: IncROM                        Conclusion: derangement                                           ROS    Assessment/Plan:    Patient is a 32 year old male with cervical and right side shoulder complaints.    Patient has the following significant findings with corresponding treatment plan.                Diagnosis 1:  Right shoulder and neck pain  Pain -  hot/cold therapy, US, manual therapy, self management, education, directional preference exercise and home program  Decreased ROM/flexibility - manual therapy, therapeutic exercise and home program  Decreased strength - therapeutic exercise, therapeutic activities and home program  Impaired muscle performance - neuro re-education and home program  Decreased function - therapeutic activities and home program  Impaired posture - neuro re-education and home program    Therapy Evaluation Codes:     Cumulative Therapy Evaluation is: Low complexity.    Previous and current functional limitations:  (See Goal Flow Sheet for this information)    Short term and Long term goals: (See Goal Flow Sheet for this information)      Communication ability:  Patient appears to be able to clearly communicate and understand verbal and written communication and follow directions correctly.  Treatment Explanation - The following has been discussed with the patient:   RX ordered/plan of care  Anticipated outcomes  Possible risks and side effects  This patient would benefit from PT intervention to resume normal activities.   Rehab potential is excellent.    Frequency:  1 X week, once daily  Duration:  for 4-6 weeks  Discharge Plan:  Achieve all LTG.  Independent in home treatment program.  Reach maximal therapeutic benefit.    Please refer to the daily flowsheet for treatment today, total treatment time and time spent performing 1:1 timed codes.

## 2023-01-11 ENCOUNTER — THERAPY VISIT (OUTPATIENT)
Dept: PHYSICAL THERAPY | Facility: CLINIC | Age: 33
End: 2023-01-11
Payer: COMMERCIAL

## 2023-01-11 DIAGNOSIS — M54.2 CERVICAL PAIN: ICD-10-CM

## 2023-01-11 DIAGNOSIS — M25.511 SHOULDER PAIN, RIGHT: Primary | ICD-10-CM

## 2023-01-11 PROCEDURE — 97140 MANUAL THERAPY 1/> REGIONS: CPT | Mod: GP | Performed by: PHYSICAL THERAPIST

## 2023-01-11 PROCEDURE — 97110 THERAPEUTIC EXERCISES: CPT | Mod: GP | Performed by: PHYSICAL THERAPIST

## 2023-03-01 ENCOUNTER — OFFICE VISIT (OUTPATIENT)
Dept: URGENT CARE | Facility: URGENT CARE | Age: 33
End: 2023-03-01
Payer: COMMERCIAL

## 2023-03-01 VITALS
TEMPERATURE: 98.6 F | SYSTOLIC BLOOD PRESSURE: 136 MMHG | HEART RATE: 87 BPM | OXYGEN SATURATION: 98 % | DIASTOLIC BLOOD PRESSURE: 85 MMHG

## 2023-03-01 DIAGNOSIS — M54.2 NECK PAIN: ICD-10-CM

## 2023-03-01 DIAGNOSIS — R07.89 ATYPICAL CHEST PAIN: ICD-10-CM

## 2023-03-01 DIAGNOSIS — R51.9 ACUTE NONINTRACTABLE HEADACHE, UNSPECIFIED HEADACHE TYPE: ICD-10-CM
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
## 2023-03-01 PROCEDURE — 99214 OFFICE O/P EST MOD 30 MIN: CPT | Performed by: FAMILY MEDICINE

## 2023-03-01 RX ORDER — METHOCARBAMOL 500 MG/1
500 TABLET, FILM COATED ORAL 4 TIMES DAILY PRN
Qty: 30 TABLET | Refills: 0 | Status: SHIPPED | OUTPATIENT
Start: 2023-03-01 | End: 2023-11-22
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
## 2023-03-02 NOTE — PATIENT INSTRUCTIONS
Okay to take ibuprofen 200 mg - 4 tablets (800 mg) every 8 hours as needed (after 24 hours after injury)  Okay to take tylenol 500 mg - 2 tablets (1000 mg) every 6-8 hours as needed, do not exceed 3000 mg in 24 hours.  Okay to take robaxin 500 mg every 6-8 hours as needed for muscle relaxant, this will cause drowsiness  Ice for 1-2 days, then okay for ice/heat to areas of discomfort

## 2023-08-08 NOTE — PROGRESS NOTES
Discharge Note    Progress reporting period is from initial evaluation date (please see noted date below) to Jan 11, 2023.  Linked Episodes   Type: Episode: Status: Noted: Resolved: Last update: Updated by:   PHYSICAL THERAPY Right Shoulder/Neck 1/4/23 Active 1/4/2023 1/11/2023  7:32 AM Agapito Choi, PT      Comments:       Luis failed to follow up and current status is unknown.  Please see information below for last relevant information on current status.  Patient seen for 2 visits.    SUBJECTIVE  Subjective changes noted by patient:  Feeling overall about the same.  .  Current pain level is  .     Previous pain level was  1/10.   Changes in function:  Yes (See Goal flowsheet attached for changes in current functional level)  Adverse reaction to treatment or activity: None    OBJECTIVE  Changes noted in objective findings: Left rotation WNL, right rotation min restriction.  Tight right rhomboid, upper trap and paraspinals.  Increased motion and decreased pain following retraction/extension.     ASSESSMENT/PLAN  Diagnosis: Right Neck   Updated problem list and treatment plan:   Pain - HEP  Decreased ROM/flexibility - HEP  Decreased function - HEP  Decreased strength - HEP  STG/LTGs have been met or progress has been made towards goals:  Yes, please see goal flowsheet for most current information  Assessment of Progress: current status is unknown.    Last current status: Pt is progressing as expected   Self Management Plans:  HEP  I have re-evaluated this patient and find that the nature, scope, duration and intensity of the therapy is appropriate for the medical condition of the patient.  Luis continues to require the following intervention to meet STG and LTG's:  HEP.    Recommendations:  Discharge with current home program.  Patient to follow up with MD as needed.    Please refer to the daily flowsheet for treatment today, total treatment time and time spent performing 1:1 timed codes.

## 2023-11-22 ENCOUNTER — OFFICE VISIT (OUTPATIENT)
Dept: FAMILY MEDICINE | Facility: CLINIC | Age: 33
End: 2023-11-22
Payer: COMMERCIAL

## 2023-11-22 VITALS
TEMPERATURE: 98.3 F | BODY MASS INDEX: 29.03 KG/M2 | WEIGHT: 219 LBS | SYSTOLIC BLOOD PRESSURE: 146 MMHG | HEART RATE: 50 BPM | DIASTOLIC BLOOD PRESSURE: 77 MMHG | HEIGHT: 73 IN | OXYGEN SATURATION: 100 %

## 2023-11-22 DIAGNOSIS — Z71.84 TRAVEL ADVICE ENCOUNTER: Primary | ICD-10-CM

## 2023-11-22 PROCEDURE — 99401 PREV MED CNSL INDIV APPRX 15: CPT | Mod: 25 | Performed by: NURSE PRACTITIONER

## 2023-11-22 PROCEDURE — 90691 TYPHOID VACCINE IM: CPT | Mod: GA | Performed by: NURSE PRACTITIONER

## 2023-11-22 PROCEDURE — 90471 IMMUNIZATION ADMIN: CPT | Mod: GA | Performed by: NURSE PRACTITIONER

## 2023-11-22 RX ORDER — AZITHROMYCIN 500 MG/1
500 TABLET, FILM COATED ORAL DAILY
Qty: 3 TABLET | Refills: 0 | Status: SHIPPED | OUTPATIENT
Start: 2023-11-22 | End: 2023-11-25

## 2023-11-22 RX ORDER — ONDANSETRON 4 MG/1
4 TABLET, ORALLY DISINTEGRATING ORAL EVERY 8 HOURS PRN
Qty: 9 TABLET | Refills: 0 | Status: SHIPPED | OUTPATIENT
Start: 2023-11-22 | End: 2024-01-16

## 2023-11-22 NOTE — PROGRESS NOTES
Nurse Note ( Pre-Travel Consult)    Itinerary:  [place], [place], [place]     Departure Date: 12/202023  /  /Return Date: 01/05/2024    Length of Trip 16 days     Reason for Travel: Tourism         Urban or rural: urban    Accommodations: Hotel        IMMUNIZATION HISTORY  Have you received any immunizations within the past 4 weeks?  No  Have you ever fainted from having your blood drawn or from an injection?  No  Have you ever had a fever reaction to vaccination?  No  Have you ever had any bad reaction or side effect from any vaccination?  Yes  Have you ever had hepatitis A or B vaccine?  Yes  Do you live (or work closely) with anyone who has AIDS, an AIDS-like condition, any other immune disorder or who is on chemotherapy for cancer?  No  Do you have a family history of immunodeficiency?  No  Have you received any injection of immune globulin or any blood products during the past 12 months?  No    Patient roomed by Ugo Mendoza  Luis Galvez is a 33 year old male seen today with spouse for counsultation for international travel.   Patient will be departing in  1 month(s) and  traveling with spouse.      Patient itinerary :  will be in the urban region of [facility] >  [facility] > [facility] > [facility] > [facility] > [facility]   which risk for Dengue Fever, food borne illnesses, motor vehicle accidents, and Typhoid. exposure.      Patient's activities will include sightseeing, hiking, and beach activities (salt water).    Patient's country of birth is [place]    Special medical concerns: h/o SVT > has treatment   Pre-travel questionnaire was completed by patient and reviewed by provider.   Vitals: BP (!) 146/77   Pulse 50   Temp 98.3  F (36.8  C) (Temporal)   Wt 99.3 kg (219 lb)   SpO2 100%   BMI 28.89 kg/m    BMI= Body mass index is 28.89 kg/m .    EXAM:  General:  Well-nourished, well-developed in no acute distress.  Appears to be stated age, interacts appropriately and expresses understanding of information given  to patient.    Current Outpatient Medications   Medication Sig Dispense Refill    azithromycin (ZITHROMAX) 500 MG tablet Take 1 tablet (500 mg) by mouth daily for 3 doses Take 1 tablet a day for up to 3 days for severe diarrhea 3 tablet 0    ondansetron (ZOFRAN ODT) 4 MG ODT tab Take 1 tablet (4 mg) by mouth every 8 hours as needed 9 tablet 0     Patient Active Problem List   Diagnosis    History of myocarditis    Healthcare maintenance    Shoulder pain, right    Cervical pain     Allergies   Allergen Reactions    Penicillins          Immunizations discussed include:   Covid 19: Has had 2 and had myocarditis after second dose  Hepatitis A:  Up to date  Hepatitis B: Up to date  Influenza: Up to date  Typhoid: Ordered/given today, risks, benefits and side effects reviewed  Rabies:future order  reviewed managment of a animal bite or scratch (washing wound, seek medical care within 24 hours for post exposure prophylaxis ) and Insufficient time to vaccinate  Yellow Fever: Not indicated  Japanese Encephalitis: Not indicated - risk of disease is minimal urban  Meningococcus: Not indicated  Tetanus/Diphtheria: Up to date  Measles/Mumps/Rubella: Up to date  Cholera: Not needed  Polio: Up to date  Pneumococcal: Under age of 65  Varicella: Immune by disease history per patient report  Shingrix: Not indicated  HPV:  Not indicated     TB: low risk     Altitude Exposure on this trip: no  Past tolerance to Altitude: na    ASSESSMENT/PLAN:  Gama was seen today for travel clinic.    Diagnoses and all orders for this visit:    Travel advice encounter  -     azithromycin (ZITHROMAX) 500 MG tablet; Take 1 tablet (500 mg) by mouth daily for 3 doses Take 1 tablet a day for up to 3 days for severe diarrhea  -     ondansetron (ZOFRAN ODT) 4 MG ODT tab; Take 1 tablet (4 mg) by mouth every 8 hours as needed    Other orders  -     TYPHOID VACCINE, IM  -     RABIES VACCINE, IM (IMOVAX); Standing      I have reviewed general recommendations for  safe travel   including: food/water precautions, insect precautions, safer sex   practices given high prevalence of Zika, HIV and other STDs,   roadway safety. Educational materials and [employer] report provided.    Malaraia prophylaxis recommended: none  Symptomatic treatment for traveler's diarrhea: azithromycin      Evacuation insurance advised and resources were provided to patient.    Total visit time 20 minutes  with over 50% of time spent counseling patient and shared decision making as detailed above.    Caren Martini CNP  Certificate in Travel Health

## 2023-11-22 NOTE — PATIENT INSTRUCTIONS
Thank you for visiting the [facility] : [phone]  Today November 22, 2023 you received the    Typhoid - injectable. This vaccine is valid for two years.     Follow up vaccine appointments can be made as a NURSE ONLY visit at the Travel Clinic, (BE PREPARED TO WAIT, ) or at designated [facility].    If you are receiving the Rabies vaccines series, it is important that you follow the exact schedule ordered.     Pre-travel     We recommend that you purchase Medical Evacuation Insurance prior to your departure.  Https://wwwnc.cdc.gov/travel/page/insurance    [place] your travel plans with the  Department of State through STEP ( Smart Traveler Enrollment Program ) https://step.state.gov.  STEP is a free service to allow [place] citizens and nationals traveling and living abroad to enroll their trip with the nearest U.S. Embassy or Consulate.    Animal Exposure: Avoid all mammals even if they look healthy.  If there is a bite, scratch or even a lick, wash area immediately with soap and water for 15 minutes and seek medical care within 24 hours for evaluation of Rabies post exposure treatment.  Contact your Medical Evacuation Insurance.    Repiratory illness prevention strategies ( Covid and Influenza ) CDC recommendations:  Consider wearing a mask in crowded or poorly ventilated indoor areas, including on public transportation and in transportation hubs.  Hand washing: frequent, thorough handwashing with soap and water for 20 seconds. Use an alcohol-based hand  with at least 60% alcohol if soap and water are not readily available. Avoid touching face, nose, eyes, mouth unless you have done appropriate hand washing as above.  VACCINES: Staying up to date on COVID-19 vaccines significantly lowers the risk of getting very sick, being hospitalized, or dying from COVID-19. CDC recommends that everyone stay up to date on their COVID-19 vaccines, especially people  with weakened immune systems.    Travel Covid 19 Testing:  updated 12/06/2021  International travelers: Pre-travel:  See country specific Embassy websites or airline websites.    Post travel: CDC recommends getting tested 3-5 days after your trip     Post-travel illness:  Contact your provider or [facility] if you develop a fever, rash, cough, diarrhea or other symptoms for up to 1 year after travel.  Inform your healthcare provider when and where you traveled to.    Please call the [facility] with any questions [phone]  Or send your provider a 'My Chart' note.

## 2023-12-01 ENCOUNTER — ALLIED HEALTH/NURSE VISIT (OUTPATIENT)
Dept: FAMILY MEDICINE | Facility: CLINIC | Age: 33
End: 2023-12-01
Payer: COMMERCIAL

## 2023-12-01 DIAGNOSIS — Z23 NEED FOR RABIES VACCINATION: Primary | ICD-10-CM

## 2023-12-01 PROCEDURE — 90675 RABIES VACCINE IM: CPT

## 2023-12-01 PROCEDURE — 90471 IMMUNIZATION ADMIN: CPT

## 2023-12-01 PROCEDURE — 99207 PR NO CHARGE NURSE ONLY: CPT

## 2023-12-01 NOTE — PROGRESS NOTES
Prior to immunization administration, verified patients identity using patient s name and date of birth. Please see Immunization Activity for additional information.     Screening Questionnaire for Adult Immunization    Are you sick today?   No   Do you have allergies to medications, food, a vaccine component or latex?   No   Have you ever had a serious reaction after receiving a vaccination?   No   Do you have a long-term health problem with heart, lung, kidney, or metabolic disease (e.g., diabetes), asthma, a blood disorder, no spleen, complement component deficiency, a cochlear implant, or a spinal fluid leak?  Are you on long-term aspirin therapy?   No   Do you have cancer, leukemia, HIV/AIDS, or any other immune system problem?   No   Do you have a parent, brother, or sister with an immune system problem?   No   In the past 3 months, have you taken medications that affect  your immune system, such as prednisone, other steroids, or anticancer drugs; drugs for the treatment of rheumatoid arthritis, Crohn s disease, or psoriasis; or have you had radiation treatments?   No   Have you had a seizure, or a brain or other nervous system problem?   No   During the past year, have you received a transfusion of blood or blood    products, or been given immune (gamma) globulin or antiviral drug?   No   For women: Are you pregnant or is there a chance you could become       pregnant during the next month?   No   Have you received any vaccinations in the past 4 weeks?   No     Immunization questionnaire answers were all negative.    I have reviewed the following standing orders: Rabies      Patient instructed to remain in clinic for 15 minutes afterwards, and to report any adverse reactions.     Screening performed by Farrukh Sales CMA on 12/1/2023 at 2:27 PM.

## 2023-12-08 ENCOUNTER — ALLIED HEALTH/NURSE VISIT (OUTPATIENT)
Dept: FAMILY MEDICINE | Facility: CLINIC | Age: 33
End: 2023-12-08
Payer: COMMERCIAL

## 2023-12-08 DIAGNOSIS — Z71.84 TRAVEL ADVICE ENCOUNTER: Primary | ICD-10-CM

## 2023-12-08 PROCEDURE — 90675 RABIES VACCINE IM: CPT

## 2023-12-08 PROCEDURE — 99207 PR NO CHARGE NURSE ONLY: CPT

## 2023-12-08 PROCEDURE — 90471 IMMUNIZATION ADMIN: CPT

## 2023-12-08 NOTE — PROGRESS NOTES
Prior to immunization administration, verified patients identity using patient s name and date of birth. Please see Immunization Activity for additional information.     Screening Questionnaire for Adult Immunization    Are you sick today?   No   Do you have allergies to medications, food, a vaccine component or latex?   No   Have you ever had a serious reaction after receiving a vaccination?   No   Do you have a long-term health problem with heart, lung, kidney, or metabolic disease (e.g., diabetes), asthma, a blood disorder, no spleen, complement component deficiency, a cochlear implant, or a spinal fluid leak?  Are you on long-term aspirin therapy?   No   Do you have cancer, leukemia, HIV/AIDS, or any other immune system problem?   No   Do you have a parent, brother, or sister with an immune system problem?   No   In the past 3 months, have you taken medications that affect  your immune system, such as prednisone, other steroids, or anticancer drugs; drugs for the treatment of rheumatoid arthritis, Crohn s disease, or psoriasis; or have you had radiation treatments?   No   Have you had a seizure, or a brain or other nervous system problem?   No   During the past year, have you received a transfusion of blood or blood    products, or been given immune (gamma) globulin or antiviral drug?   No   For women: Are you pregnant or is there a chance you could become       pregnant during the next month?   No   Have you received any vaccinations in the past 4 weeks?   Yes     Immunization questionnaire was positive for at least one answer.    I have reviewed the following standing orders: Rabies Vaccine Not Applicable; Order were already placed prior to ancillary visit    Patient instructed to remain in clinic for 15 minutes afterwards, and to report any adverse reactions.     Screening performed by Lashonda Gil on 12/8/2023 at 3:53 PM.

## 2023-12-17 ENCOUNTER — HEALTH MAINTENANCE LETTER (OUTPATIENT)
Age: 33
End: 2023-12-17

## 2024-01-15 ENCOUNTER — ALLIED HEALTH/NURSE VISIT (OUTPATIENT)
Dept: FAMILY MEDICINE | Facility: CLINIC | Age: 34
End: 2024-01-15
Payer: COMMERCIAL

## 2024-01-15 DIAGNOSIS — Z23 ENCOUNTER FOR IMMUNIZATION: Primary | ICD-10-CM

## 2024-01-15 PROCEDURE — 90471 IMMUNIZATION ADMIN: CPT

## 2024-01-15 PROCEDURE — 99207 PR NO CHARGE NURSE ONLY: CPT

## 2024-01-15 PROCEDURE — 90675 RABIES VACCINE IM: CPT

## 2024-01-15 ASSESSMENT — ENCOUNTER SYMPTOMS
DIARRHEA: 0
ABDOMINAL PAIN: 0
EYE PAIN: 0
NAUSEA: 0
FEVER: 0
SHORTNESS OF BREATH: 0
FREQUENCY: 0
ARTHRALGIAS: 0
CHILLS: 0
JOINT SWELLING: 0
HEARTBURN: 0
SORE THROAT: 0
HEMATOCHEZIA: 0
DIZZINESS: 0
PARESTHESIAS: 0
HEMATURIA: 0
WEAKNESS: 0
COUGH: 0
NERVOUS/ANXIOUS: 0
DYSURIA: 0
PALPITATIONS: 0
CONSTIPATION: 0
MYALGIAS: 0
HEADACHES: 0

## 2024-01-15 NOTE — PROGRESS NOTES
Prior to immunization administration, verified patients identity using patient s name and date of birth. Please see Immunization Activity for additional information.     Screening Questionnaire for Adult Immunization    Are you sick today?   No   Do you have allergies to medications, food, a vaccine component or latex?   No   Have you ever had a serious reaction after receiving a vaccination?   Yes   Do you have a long-term health problem with heart, lung, kidney, or metabolic disease (e.g., diabetes), asthma, a blood disorder, no spleen, complement component deficiency, a cochlear implant, or a spinal fluid leak?  Are you on long-term aspirin therapy?   No   Do you have cancer, leukemia, HIV/AIDS, or any other immune system problem?   No   Do you have a parent, brother, or sister with an immune system problem?   No   In the past 3 months, have you taken medications that affect  your immune system, such as prednisone, other steroids, or anticancer drugs; drugs for the treatment of rheumatoid arthritis, Crohn s disease, or psoriasis; or have you had radiation treatments?   No   Have you had a seizure, or a brain or other nervous system problem?   No   During the past year, have you received a transfusion of blood or blood    products, or been given immune (gamma) globulin or antiviral drug?   No   For women: Are you pregnant or is there a chance you could become       pregnant during the next month?   No   Have you received any vaccinations in the past 4 weeks?   No     Immunization questionnaire was positive for at least one answer.    I have reviewed the following standing orders: Rabies Vaccine  Not Applicable; Order were already placed prior to ancillary visit    Patient instructed to remain in clinic for 15 minutes afterwards, and to report any adverse reactions.     Screening performed by Lashonda Gil on 1/15/2024 at 4:38 PM.

## 2024-01-16 ENCOUNTER — OFFICE VISIT (OUTPATIENT)
Dept: FAMILY MEDICINE | Facility: CLINIC | Age: 34
End: 2024-01-16
Payer: COMMERCIAL

## 2024-01-16 VITALS
HEIGHT: 74 IN | RESPIRATION RATE: 19 BRPM | SYSTOLIC BLOOD PRESSURE: 118 MMHG | OXYGEN SATURATION: 97 % | HEART RATE: 88 BPM | DIASTOLIC BLOOD PRESSURE: 68 MMHG | BODY MASS INDEX: 26.89 KG/M2 | TEMPERATURE: 99 F | WEIGHT: 209.5 LBS

## 2024-01-16 DIAGNOSIS — R21 RASH: ICD-10-CM

## 2024-01-16 DIAGNOSIS — Z00.00 HEALTHCARE MAINTENANCE: ICD-10-CM

## 2024-01-16 DIAGNOSIS — E78.00 HYPERCHOLESTEROLEMIA: Primary | ICD-10-CM

## 2024-01-16 LAB — GLUCOSE BLD-MCNC: 95 MG/DL (ref 60–99)

## 2024-01-16 PROCEDURE — 82947 ASSAY GLUCOSE BLOOD QUANT: CPT | Performed by: FAMILY MEDICINE

## 2024-01-16 PROCEDURE — 80061 LIPID PANEL: CPT | Performed by: FAMILY MEDICINE

## 2024-01-16 PROCEDURE — 99213 OFFICE O/P EST LOW 20 MIN: CPT | Mod: 25 | Performed by: FAMILY MEDICINE

## 2024-01-16 PROCEDURE — 36415 COLL VENOUS BLD VENIPUNCTURE: CPT | Performed by: FAMILY MEDICINE

## 2024-01-16 PROCEDURE — 99395 PREV VISIT EST AGE 18-39: CPT | Performed by: FAMILY MEDICINE

## 2024-01-16 NOTE — PROGRESS NOTES
Adult Male Physical  A/P  1. Hypercholesterolemia  Mild/minimal, fasting labs last year with slightly high triglycerides and LDL of 125.  Patient fasting today and curious about what his numbers are since he recently was traveling in [place] and lost a fair amount of weight.    - Lipid Profile; Future  - Glucose, whole blood; Future  - Lipid Profile  - Glucose, whole blood    2. Rash  Return from a show with a rash on his right biceps and medial upper arm.  Lesser rash right lower leg.  Erythematous nonpruritic.  Has tried triamcinolone and more recently antifungal cream.  Total of 2 weeks of antifungal, the last 5 had been within on  version.  No noted improvement.  No other symptoms, no constitutional symptoms.  Was mostly in cities but weather was very warm.    Exam shows an erythematous irregularly shaped ridge slightly raised, minimally scaly (photos show more scaly rash) with distinct margin.  Background of hyperpigmentation.    Patient had a virtual visit for this and provider felt it was likely fungal rash, I would agree but it is not improving.  Will refer to dermatology.  - Adult Dermatology  Referral; Future    3. Healthcare maintenance  Unable to receive COVID vaccines because of myocarditis after COVID shot.  Has had illness once without resurgeons of problem.        HPI  Current Concerns/ Questions  33-year-old, , , here for physical exam.  Question about rash as described above.  No other problems.  Hobby is power lifting, no recent competitions.  Hurt his back earlier this year and is now recovered.  PFSH:  Current medications reviewed as follows:  No current outpatient medications on file prior to visit.  No current facility-administered medications on file prior to visit.     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     Past Surgical History:   Procedure Laterality Date    CRYO-FOCAL RETINAL LASER OS (LEFT EYE) Left 2006    EYE SURGERY  2005    Laser surgery for retina detachment    ORTHOPEDIC SURGERY  2012    meniscus removal in right knee    right knee arthroscopic surgery Right 2012     Family History   Problem Relation Age of Onset    Hypertension Father      History   Smoking Status    Never   Smokeless Tobacco    Never       Social History     Social History Narrative    , works as a     Competes in power lifting     Immunization History   Administered Date(s) Administered    COVID-19 Monovalent 18+ (Moderna) 02/25/2021, 03/24/2021    Flu, Unspecified 09/26/2015, 09/10/2022    HepB, Unspecified 06/03/2004, 07/07/2004, 12/13/2004    Hepatitis A (ADULT 19+) 03/13/2019, 03/03/2020    Hib, Unspecified 1991, 02/07/1992, 04/09/1993    Historical DTP/aP 1991, 02/07/1992, 04/14/1992, 04/09/1993, 08/20/1996    Influenza (H1N1) 02/04/2010    Influenza Vaccine 18-64 (Flublok) 10/07/2020    Influenza Vaccine >6 months,quad, PF 09/26/2021, 09/10/2022    Influenza Vaccine, 6+MO IM (QUADRIVALENT W/PRESERVATIVES) 10/12/2017, 10/01/2018, 10/07/2019    Influenza,INJ,MDCK,PF,Quad >6mo(Flucelvax) 10/10/2023    MMR 01/12/1993, 04/19/1993, 07/07/2004    Measles 01/12/1993    Polio, Unspecified  1991, 02/07/1992, 04/09/1993, 08/20/1996    Rabies - IM Diploid Cell Culture 12/01/2023, 12/08/2023, 01/15/2024    TDAP Vaccine (Adacel) 03/13/2019    Td (Adult), Adsorbed 07/07/2004    Typhoid IM 03/13/2019, 11/22/2023    Yellow Fever, Live (Stamaril) 03/13/2019     Body mass index is 26.86 kg/m .        Objective  Physical Exam  Vitals:    01/16/24 0752   BP: 118/68   BP Location: Left arm   Patient Position: Sitting   Cuff Size: Adult Large   Pulse: 88   Resp: 19   Temp: 99  F (37.2  C)   TempSrc: Oral   SpO2: 97%   Weight: 95 kg (209 lb 8 oz)  "  Height: 1.881 m (6' 2.06\")       Gen- alert and oriented x3, no acute distress  HEENT- Normocephalic atraumatic   pupils equal and reactive, EOMI.    TMs visualized and normal, ear canals normal.    Mouth moist with normal mucosa no ulceration, dentition normal or in good repair.  Neck- supple, no adenopathy or thyromegaly  Chest- Normal chest wall apperance, normal inspiration and expiration.  Clear to asculation.  CV- Regular rate and rhythm, normal tones, no murmus, gallops or rubs.  Abd-  Soft, nodistended, nontender.  Normal bowel sounds, no mass or organ enlargement.  Genitalia-  was done  and result was normal, testicles, inguinal rings normal.    Ext- Atraumatic,  No synovial thickening. Good perfusion, no edema. Periph pulses detected  Skin-2 areas of hyperpigmentation, ridge of erythema on bicep, only hyperpigmentation on medial upper arm rash.  Skin otherwise normal, mucous membranes normal.  Neuro- Cranial nerves grossly intact.  Normal gait, normal strength.  Reflexes symmetric.  Coordination intact.    Diagnostics  Pending                  Answers submitted by the patient for this visit:  Annual Preventive Visit (Submitted on 1/15/2024)  Chief Complaint: Annual Exam:  Frequency of exercise:: 6-7 days/week  Getting at least 3 servings of Calcium per day:: Yes  Diet:: Other  Taking medications regularly:: Yes  Medication side effects:: Not applicable  Bi-annual eye exam:: Yes  Dental care twice a year:: Yes  Sleep apnea or symptoms of sleep apnea:: None  abdominal pain: No  Blood in stool: No  Blood in urine: No  chest pain: No  chills: No  congestion: Yes  constipation: No  cough: No  diarrhea: No  dizziness: No  ear pain: No  eye pain: No  nervous/anxious: No  fever: No  frequency: No  genital sores: No  headaches: No  hearing loss: No  heartburn: No  arthralgias: No  joint swelling: No  peripheral edema: No  mood changes: No  myalgias: No  nausea: No  dysuria: No  palpitations: No  Skin sensation " changes: No  sore throat: No  urgency: No  rash: Yes  shortness of breath: No  visual disturbance: No  weakness: No  impotence: No  penile discharge: No  Additional concerns today:: Yes  Exercise outside of work (Submitted on 1/15/2024)  Chief Complaint: Annual Exam:  Duration of exercise:: Greater than 60 minutes

## 2024-01-17 LAB
CHOLEST SERPL-MCNC: 199 MG/DL
FASTING STATUS PATIENT QL REPORTED: YES
HDLC SERPL-MCNC: 40 MG/DL
LDLC SERPL CALC-MCNC: 127 MG/DL
NONHDLC SERPL-MCNC: 159 MG/DL
TRIGL SERPL-MCNC: 160 MG/DL

## 2025-01-20 SDOH — HEALTH STABILITY: PHYSICAL HEALTH: ON AVERAGE, HOW MANY DAYS PER WEEK DO YOU ENGAGE IN MODERATE TO STRENUOUS EXERCISE (LIKE A BRISK WALK)?: 4 DAYS

## 2025-01-20 SDOH — HEALTH STABILITY: PHYSICAL HEALTH: ON AVERAGE, HOW MANY MINUTES DO YOU ENGAGE IN EXERCISE AT THIS LEVEL?: 120 MIN

## 2025-01-20 ASSESSMENT — SOCIAL DETERMINANTS OF HEALTH (SDOH): HOW OFTEN DO YOU GET TOGETHER WITH FRIENDS OR RELATIVES?: TWICE A WEEK

## 2025-01-23 ENCOUNTER — OFFICE VISIT (OUTPATIENT)
Dept: FAMILY MEDICINE | Facility: CLINIC | Age: 35
End: 2025-01-23
Payer: COMMERCIAL

## 2025-01-23 VITALS
DIASTOLIC BLOOD PRESSURE: 77 MMHG | TEMPERATURE: 97.4 F | HEIGHT: 74 IN | RESPIRATION RATE: 18 BRPM | BODY MASS INDEX: 28.88 KG/M2 | HEART RATE: 65 BPM | SYSTOLIC BLOOD PRESSURE: 121 MMHG | WEIGHT: 225 LBS | OXYGEN SATURATION: 100 %

## 2025-01-23 DIAGNOSIS — Z00.00 HEALTHCARE MAINTENANCE: Primary | ICD-10-CM

## 2025-01-23 LAB
CHOLEST SERPL-MCNC: 202 MG/DL
FASTING STATUS PATIENT QL REPORTED: YES
GLUCOSE BLD-MCNC: 77 MG/DL (ref 60–99)
HDLC SERPL-MCNC: 39 MG/DL
LDLC SERPL CALC-MCNC: 127 MG/DL
NONHDLC SERPL-MCNC: 163 MG/DL
TRIGL SERPL-MCNC: 181 MG/DL

## 2025-01-23 NOTE — PROGRESS NOTES
Adult Male Physical  A/P  1. Healthcare maintenance (Primary)  COVID-vaccine contraindicated because of history of vaccine induced myocarditis  Lipid and glucose  - Lipid panel reflex to direct LDL Fasting; Future  - Glucose, whole blood; Future  - Lipid panel reflex to direct LDL Fasting  - Glucose, whole blood        HPI  Current Concerns/ Questions  34-year-old, history of mild hypertriglyceridemia here for physical exam.  No questions.  Competes in power lifting, , his wife is expecting a child in July.  No other concerns  PFSH:  Current medications reviewed as follows:  No current outpatient medications on file.     No current facility-administered medications for this visit.      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     Past Surgical History:   Procedure Laterality Date    CRYO-FOCAL RETINAL LASER OS (LEFT EYE) Left 2006    EYE SURGERY  2005    Laser surgery for retina detachment    ORTHOPEDIC SURGERY  2012    meniscus removal in right knee    right knee arthroscopic surgery Right 2012     Family History   Problem Relation Age of Onset    Hypertension Father      History   Smoking Status    Never   Smokeless Tobacco    Never       Social History     Social History Narrative    , works as a     Wife expecting first child in 2025    Competes in power lifting     Immunization History   Administered Date(s) Administered    COVID-19 Monovalent 18+ (Moderna) 02/25/2021, 03/24/2021    Flu, Unspecified 09/26/2015, 09/10/2022    HIB, Unspecified 1991, 02/07/1992, 04/09/1993    HepB, Unspecified 06/03/2004, 07/07/2004, 12/13/2004    Hepatitis A (ADULT 19+) 03/13/2019, 03/03/2020    Historical DTP/aP 1991, 02/07/1992, 04/14/1992, 04/09/1993, 08/20/1996    Influenza (H1N1) 02/04/2010  "   Influenza Vaccine 18-64 (Flublok) 10/07/2020    Influenza Vaccine >6 months,quad, PF 09/26/2021, 09/10/2022    Influenza Vaccine, 6+MO IM (QUADRIVALENT W/PRESERVATIVES) 10/12/2017, 10/01/2018, 10/07/2019    Influenza, Split Virus, Trivalent, Pf (Fluzone\Fluarix) 10/23/2024    Influenza,INJ,MDCK,PF,Quad >6mo(Flucelvax) 10/10/2023    MMR 01/12/1993, 04/19/1993, 07/07/2004    Measles 01/12/1993    Polio, Unspecified 1991, 02/07/1992, 04/09/1993, 08/20/1996    Rabies Vaccine (Imovax) 12/01/2023, 12/08/2023, 01/15/2024    TDAP Vaccine (Adacel) 03/13/2019    Td (Adult), Adsorbed 07/07/2004    Typhoid IM 03/13/2019, 11/22/2023    Yellow Fever, Live (Stamaril) 03/13/2019     Body mass index is 28.88 kg/m .        Objective  Physical Exam  Vitals:    01/23/25 0942   BP: 121/77   BP Location: Right arm   Patient Position: Sitting   Cuff Size: Adult Large   Pulse: 65   Resp: 18   Temp: 97.4  F (36.3  C)   TempSrc: Temporal   SpO2: 100%   Weight: 102.1 kg (225 lb)   Height: 1.88 m (6' 2.02\")       Gen- alert and oriented x3, no acute distress  HEENT- Normocephalic atraumatic   pupils equal and reactive, EOMI.    TMs visualized and normal, ear canals normal.    Mouth moist with normal mucosa no ulceration, dentition normal or in good repair.  Neck- supple, no adenopathy or thyromegaly  Chest- Normal chest wall apperance, normal inspiration and expiration.  Clear to asculation.  CV- Regular rate and rhythm, normal tones, no murmus, gallops or rubs.  Abd-  Soft, nodistended, nontender.  Normal bowel sounds, no mass or organ enlargement.  Genitalia-  was done  and result was normal, check for hernia  THADDEUS: was not done  Ext- Atraumatic,  No synovial thickening. Good perfusion, no edema. Periph pulses detected  Skin- warm and dry, no rash  Neuro- Cranial nerves grossly intact.  Normal gait, normal strength.  Coordination intact.    Diagnostics  Pending                "

## 2025-01-23 NOTE — PROGRESS NOTES
Preventive Care Visit  [facility]  Niko Escalante MD, Family Medicine  Jan 23, 2025  {Provider  Link to [facility] :597691}    {PROVIDER CHARTING PREFERENCE:220687}    Reji Malloy is a 34 year old, presenting for the following:  Physical        1/23/2025     9:42 AM   Additional Questions   Roomed by Tia   Accompanied by Self          HPI  ***  {MA/LPN/RN Pre-Provider Visit Orders- hCG/UA/Strep (Optional):509718}  {SUPERLIST (Optional):882426}  {additonal problems for provider to add (Optional):806747}  Health Care Directive  Patient does not have a Health Care Directive: {ADVANCE_DIRECTIVE_STATUS:192844}      1/20/2025   General Health   How would you rate your overall physical health? Excellent   Feel stress (tense, anxious, or unable to sleep) Only a little   (!) STRESS CONCERN      1/20/2025   Nutrition   Three or more servings of calcium each day? Yes   Diet: Regular (no restrictions)   How many servings of fruit and vegetables per day? 4 or more   How many sweetened beverages each day? 0-1         1/20/2025   Exercise   Days per week of moderate/strenous exercise 4 days   Average minutes spent exercising at this level 120 min         1/20/2025   Social Factors   Frequency of gathering with friends or relatives Twice a week   Worry food won't last until get money to buy more No   Food not last or not have enough money for food? No   Do you have housing? (Housing is defined as stable permanent housing and does not include staying ouside in a car, in a tent, in an abandoned building, in an overnight shelter, or couch-surfing.) No   Are you worried about losing your housing? No   Lack of transportation? No   Unable to get utilities (heat,electricity)? No   Want help with housing or utility concern? No   (!) HOUSING CONCERN PRESENT      1/20/2025   Dental   Dentist two times every year? Yes         1/20/2025   TB Screening   Were you born outside of the [place]? No         Today's PHQ-2  "Score:       1/23/2025     9:38 AM   PHQ-2 ( 1999 [employer])   Q1: Little interest or pleasure in doing things 0   Q2: Feeling down, depressed or hopeless 0   PHQ-2 Score 0    Q1: Little interest or pleasure in doing things Not at all   Q2: Feeling down, depressed or hopeless Not at all   PHQ-2 Score 0       Patient-reported           1/20/2025   Substance Use   Alcohol more than 3/day or more than 7/wk No   Do you use any other substances recreationally? No     Social History     Tobacco Use    Smoking status: Never     Passive exposure: Never    Smokeless tobacco: Never   Vaping Use    Vaping status: Never Used   Substance Use Topics    Alcohol use: Yes     Comment: 5 drinks a week     Drug use: Never     {Provider  If there are gaps in the social history shown above, please follow the link to update and then refresh the note Link to Social and Substance History :205387}      1/20/2025   STI Screening   New sexual partner(s) since last STI/HIV test? No         1/20/2025   Contraception/Family Planning   Questions about contraception or family planning No     {Provider  REQUIRED FOR [facility] Use the storyboard to review patient history, after sections have been marked as reviewed, refresh note to capture documentation:932453}   Reviewed and updated as needed this visit by Provider                    {HISTORY OPTIONS (Optional):898314}    {ROS Picklists (Optional):970976}     Objective    Exam  /77 (BP Location: Right arm, Patient Position: Sitting, Cuff Size: Adult Large)   Pulse 65   Temp 97.4  F (36.3  C) (Temporal)   Resp 18   Ht 1.88 m (6' 2.02\")   Wt 102.1 kg (225 lb)   SpO2 100%   BMI 28.88 kg/m     Estimated body mass index is 28.88 kg/m  as calculated from the following:    Height as of this encounter: 1.88 m (6' 2.02\").    Weight as of this encounter: 102.1 kg (225 lb).    Physical Exam  {Exam Choices (Optional):177497}    Prior to immunization administration, verified patients identity using " patient s name and date of birth. Please see Immunization Activity for additional information.     Screening Questionnaire for Adult Immunization    Are you sick today?   No   Do you have allergies to medications, food, a vaccine component or latex?   No   Have you ever had a serious reaction after receiving a vaccination?   Yes   Do you have a long-term health problem with heart, lung, kidney, or metabolic disease (e.g., diabetes), asthma, a blood disorder, no spleen, complement component deficiency, a cochlear implant, or a spinal fluid leak?  Are you on long-term aspirin therapy?   No   Do you have cancer, leukemia, HIV/AIDS, or any other immune system problem?   No   Do you have a parent, brother, or sister with an immune system problem?   No   In the past 3 months, have you taken medications that affect  your immune system, such as prednisone, other steroids, or anticancer drugs; drugs for the treatment of rheumatoid arthritis, Crohn s disease, or psoriasis; or have you had radiation treatments?   No   Have you had a seizure, or a brain or other nervous system problem?   No   During the past year, have you received a transfusion of blood or blood    products, or been given immune (gamma) globulin or antiviral drug?   No   For women: Are you pregnant or is there a chance you could become       pregnant during the next month?   No   Have you received any vaccinations in the past 4 weeks?   No     Immunization questionnaire was positive for at least one answer.  Notified .      Patient instructed to remain in clinic for 15 minutes afterwards, and to report any adverse reactions.     Screening performed by Bobbi Redd CMA on 1/23/2025 at 9:45 AM.         Signed Electronically by: Niko Escalante MD  {Email feedback regarding this note to [email or web address]   :729037}